# Patient Record
Sex: FEMALE | Race: WHITE
[De-identification: names, ages, dates, MRNs, and addresses within clinical notes are randomized per-mention and may not be internally consistent; named-entity substitution may affect disease eponyms.]

---

## 2018-03-05 ENCOUNTER — HOSPITAL ENCOUNTER (EMERGENCY)
Dept: HOSPITAL 42 - ED | Age: 48
Discharge: HOME | End: 2018-03-05
Payer: MEDICARE

## 2018-03-05 VITALS
HEART RATE: 80 BPM | DIASTOLIC BLOOD PRESSURE: 72 MMHG | SYSTOLIC BLOOD PRESSURE: 117 MMHG | RESPIRATION RATE: 20 BRPM | OXYGEN SATURATION: 99 %

## 2018-03-05 VITALS — BODY MASS INDEX: 26.2 KG/M2

## 2018-03-05 VITALS — TEMPERATURE: 98.3 F

## 2018-03-05 DIAGNOSIS — I10: ICD-10-CM

## 2018-03-05 DIAGNOSIS — M25.531: Primary | ICD-10-CM

## 2018-03-05 NOTE — ED PDOC
Arrival/HPI





- General


Chief Complaint: Finger,Hand,&Wrist


Time Seen by Provider: 03/05/18 10:34


Historian: Patient





- History of Present Illness


Narrative History of Present Illness (Text): 





03/05/18 12:37


47yr old female with right wrist pain and swelling. pt with hx of multiple 

prior surgeries on right wrist. pt states she woke up yesterday with swelling 

to the dorsal aspect of the right wrist with pain. pt states she took naproxen 

for pain. pt states she is worried that there is an issue with the hardware 

within her wrist. pt denies trauma or injury. Pt denies numbness or weakness. 

pt states she does have residual limited extension of right 5th finger from 

prior surgery. no other complaints. 


Time/Duration: Other (1 day)





Past Medical History





- Provider Review


Nursing Documentation Reviewed: Yes





- Travel History


Have you recently traveled outside [place] w/in the past 3 mons?: No





- Infectious Disease


Hx of Infectious Diseases: None





- Tetanus Immunization


Tetanus Immunization: Unknown





- Cardiac


Hx Cardiac Disorders: Yes


Hx Circulatory Problems: Yes


Hx Heart Murmur: Yes


Hx Hypertension: Yes





- Pulmonary


Hx Respiratory Disorders: Yes


Hx Asthma: Yes


Hx Pulmonary Embolism: Yes (june 16 2015)





- Neurological


Hx Neurological Disorder: Yes


Other/Comment: peripheral neuropathy in arms and legs





- HEENT


Hx HEENT Disorder: No





- Renal


Hx Renal Disorder: No





- Endocrine/Metabolic


Hx Endocrine Disorders: No





- Hematological/Oncological


Hx Blood Disorders: Yes


Hx Anemia: Yes





- Integumentary


Hx Dermatological Disorder: Yes





- Musculoskeletal/Rheumatological


Hx Musculoskeletal Disorders: Yes


Hx Degenerative Joint Disease: Yes


Hx Falls: No


Hx Herniated Disk: Yes (cervical lumbar)


Hx Rheumatoid Arthritis: Yes


Other/Comment: NECK PAIN





- Gastrointestinal


Hx Gastrointestinal Disorders: No





- Genitourinary/Gynecological


Hx Genitourinary Disorders: No





- Psychiatric


Hx Psychophysiologic Disorder: No


Hx Substance Use: No





- Surgical History


Hx Appendectomy: Yes


Hx Orthopedic Surgery: Yes (left wrist carpal tunnel repair right wrist)


Hx Tubal Ligation: Yes





- Anesthesia


Hx Anesthesia: Yes


Hx Anesthesia Reactions: No


Hx Malignant Hyperthermia: No





- Suicidal Assessment


Feels Threatened In Home Enviroment: No





Family/Social History





- Physician Review


Nursing Documentation Reviewed: Yes


Family/Social History: Unknown Family HX


Smoking Status: Never Smoked


Hx Alcohol Use: No


Hx Substance Use: No


Hx Substance Use Treatment: No





Allergies/Home Meds


Allergies/Adverse Reactions: 


Allergies





hydrocortisone Allergy (Verified 03/05/18 09:56)


 RASH


latex Allergy (Verified 03/05/18 09:56)


 SHORTNESS OF BREATH


nasal cannula Adverse Reaction (Uncoded 03/05/18 09:56)


 SHORTNESS OF BREATH








Home Medications: 


 Home Meds











 Medication  Instructions  Recorded  Confirmed


 


Prednisone [Prednisone] 1 tab PO DAILY 10/09/14 03/05/18


 


Tofacitinib Citrate [Xeljanz] 5 mg PO BID 06/16/15 03/05/18


 


Albuterol Sulfate [Ventolin Hfa] 2 puff IH BID PRN 01/04/16 03/05/18


 


Milnacipran HCl [Savella] 50 mg PO BID 01/22/16 03/05/18














Review of Systems





- Review of Systems


Constitutional: absent: Fatigue, Fevers


Respiratory: absent: SOB, Cough


Cardiovascular: absent: Chest Pain, Palpitations


Gastrointestinal: absent: Abdominal Pain, Nausea, Vomiting


Musculoskeletal: Arthralgias


Skin: absent: Rash, Pruritis


Psychiatric: absent: Anxiety, Depression





Physical Exam


Vital Signs Reviewed: Yes





Vital Signs











  Temp Pulse Resp BP Pulse Ox


 


 03/05/18 11:32   68  18  116/71  100


 


 03/05/18 10:02  98.3 F  70  16  118/76  100











Temperature: Afebrile


Blood Pressure: Normal


Pulse: Regular


Respiratory Rate: Normal


Appearance: Positive for: Well-Appearing, Non-Toxic, Comfortable


Pain Distress: None


Mental Status: Positive for: Alert and Oriented X 3





- Systems Exam


Head: Present: Atraumatic


Respiratory/Chest: Present: Clear to Auscultation


Cardiovascular: Present: Regular Rate and Rhythm


Upper Extremity: Present: NORMAL PULSES, Tenderness (right wrist: + swelling 

noted to dorsal aspect of wrist over ulna; no erythema; limited rom of wrist; 

sensation and distal pulses intact. ), Swelling, Neurovascularly Intact, 

Capillary Refill < 2s.  No: Normal ROM, Erythema, Deformity


Neurological: Present: GCS=15


Skin: Present: Warm, Dry, Normal Color.  No: Rashes


Psychiatric: Present: Oriented x 3





Medical Decision Making


ED Course and Treatment: 





03/05/18 12:32


pt with right wrist pain; hx of prior surgery








xray; FINDINGS:


BONES:


There is a prosthesis in the distal ulna.  There has been previous fusion of 

the radius and carpal bones. The findings are unchanged. No acute fracture


JOINTS:


Normal. No dislocation. 


SOFT TISSUES:


Normal. 


OTHER FINDINGS:


None.


IMPRESSION:


No acute fracture








pt placed in sugar tong splint; 





pt claims that there was erythema to a small portion of the dorsal wrist; There 

is no erythema at present time, but I will cover with keflex. 








pt was advised to f/u with orthopedist/surgeon within the next 2 days. 











impression: wrist pain


motrin every 6 hours as needed for pain


keflex; 1 capsule 4 times daily x 7 days


follow up with the orthopedist within the next 2 days


return if symptoms worsen,persist or if new symptoms develop. 











- RAD Interpretation


Radiology Orders: 











03/05/18 10:45


WRIST, RIGHT 3 VIEWS [RAD] Stat 














Procedures





- Splinting


Location: right wrist


Hand-Made Type: fiberglass


Splint: sugar tong


Pre-Proc Neuro Vasc Exam: normal


Post-Proc Neuro Vasc Exam: normal





Disposition/Present on Arrival





- Present on Arrival


Any Indicators Present on Arrival: No


History of DVT/PE: No


History of Uncontrolled Diabetes: No


Urinary Catheter: No


History of Decub. Ulcer: No


History Surgical Site Infection Following: None





- Disposition


Have Diagnosis and Disposition been Completed?: Yes


Diagnosis: 


 Wrist pain





Disposition: HOME/ ROUTINE


Disposition Time: 12:35


Patient Plan: Discharge


Condition: GOOD


Additional Instructions: 


tylenol every 4 hours as needed for pain


keflex; 1 capsule 4 times daily x 7 days


follow up with the orthopedist within the next 2 days


return if symptoms worsen,persist or if new symptoms develop. 


Prescriptions: 


Cephalexin [Keflex] 500 mg PO QID #28 capsule


Referrals: 


Clary Guo MD [Staff Provider] - Follow up with primary


[facility] [Outside] - Follow up with primary


Forms:  Spar[employer] (English), WORK NOTE

## 2018-09-18 ENCOUNTER — HOSPITAL ENCOUNTER (OUTPATIENT)
Dept: HOSPITAL 42 - ED | Age: 48
Setting detail: OBSERVATION
LOS: 2 days | Discharge: HOME | End: 2018-09-20
Attending: INTERNAL MEDICINE | Admitting: INTERNAL MEDICINE
Payer: MEDICARE

## 2018-09-18 VITALS — BODY MASS INDEX: 21.6 KG/M2

## 2018-09-18 DIAGNOSIS — E86.0: ICD-10-CM

## 2018-09-18 DIAGNOSIS — M06.9: ICD-10-CM

## 2018-09-18 DIAGNOSIS — E78.5: ICD-10-CM

## 2018-09-18 DIAGNOSIS — K29.70: ICD-10-CM

## 2018-09-18 DIAGNOSIS — Z86.711: ICD-10-CM

## 2018-09-18 DIAGNOSIS — R11.2: ICD-10-CM

## 2018-09-18 DIAGNOSIS — G62.9: ICD-10-CM

## 2018-09-18 DIAGNOSIS — M79.7: ICD-10-CM

## 2018-09-18 DIAGNOSIS — F12.90: ICD-10-CM

## 2018-09-18 DIAGNOSIS — Z79.82: ICD-10-CM

## 2018-09-18 DIAGNOSIS — I73.00: ICD-10-CM

## 2018-09-18 DIAGNOSIS — K59.00: ICD-10-CM

## 2018-09-18 DIAGNOSIS — I10: ICD-10-CM

## 2018-09-18 DIAGNOSIS — E87.6: Primary | ICD-10-CM

## 2018-09-18 DIAGNOSIS — J45.909: ICD-10-CM

## 2018-09-18 LAB
ALBUMIN SERPL-MCNC: 5 G/DL (ref 3–4.8)
ALBUMIN/GLOB SERPL: 1.3 {RATIO} (ref 1.1–1.8)
ALT SERPL-CCNC: 24 U/L (ref 7–56)
APPEARANCE UR: CLEAR
AST SERPL-CCNC: 35 U/L (ref 14–36)
BILIRUB UR-MCNC: NEGATIVE MG/DL
BUN SERPL-MCNC: 13 MG/DL (ref 7–21)
CALCIUM SERPL-MCNC: 10 MG/DL (ref 8.4–10.5)
COLOR UR: (no result)
GFR NON-AFRICAN AMERICAN: > 60
GLUCOSE UR STRIP-MCNC: NEGATIVE MG/DL
LEUKOCYTE ESTERASE UR-ACNC: NEGATIVE LEU/UL
PH UR STRIP: 7 [PH] (ref 4.7–8)
PROT UR STRIP-MCNC: NEGATIVE MG/DL
RBC # UR STRIP: NEGATIVE /UL
SP GR UR STRIP: <= 1.005 (ref 1–1.03)
UROBILINOGEN UR STRIP-ACNC: 0.2 E.U./DL

## 2018-09-18 PROCEDURE — 96361 HYDRATE IV INFUSION ADD-ON: CPT

## 2018-09-18 PROCEDURE — 83735 ASSAY OF MAGNESIUM: CPT

## 2018-09-18 PROCEDURE — 88305 TISSUE EXAM BY PATHOLOGIST: CPT

## 2018-09-18 PROCEDURE — 36415 COLL VENOUS BLD VENIPUNCTURE: CPT

## 2018-09-18 PROCEDURE — 96375 TX/PRO/DX INJ NEW DRUG ADDON: CPT

## 2018-09-18 PROCEDURE — 93005 ELECTROCARDIOGRAM TRACING: CPT

## 2018-09-18 PROCEDURE — 94760 N-INVAS EAR/PLS OXIMETRY 1: CPT

## 2018-09-18 PROCEDURE — 80053 COMPREHEN METABOLIC PANEL: CPT

## 2018-09-18 PROCEDURE — 74176 CT ABD & PELVIS W/O CONTRAST: CPT

## 2018-09-18 PROCEDURE — 85025 COMPLETE CBC W/AUTO DIFF WBC: CPT

## 2018-09-18 PROCEDURE — 96374 THER/PROPH/DIAG INJ IV PUSH: CPT

## 2018-09-18 PROCEDURE — 99285 EMERGENCY DEPT VISIT HI MDM: CPT

## 2018-09-18 PROCEDURE — 88342 IMHCHEM/IMCYTCHM 1ST ANTB: CPT

## 2018-09-18 PROCEDURE — 81003 URINALYSIS AUTO W/O SCOPE: CPT

## 2018-09-18 PROCEDURE — 84100 ASSAY OF PHOSPHORUS: CPT

## 2018-09-18 PROCEDURE — 84145 PROCALCITONIN (PCT): CPT

## 2018-09-18 PROCEDURE — 94640 AIRWAY INHALATION TREATMENT: CPT

## 2018-09-18 PROCEDURE — 96376 TX/PRO/DX INJ SAME DRUG ADON: CPT

## 2018-09-18 PROCEDURE — 43239 EGD BIOPSY SINGLE/MULTIPLE: CPT

## 2018-09-18 PROCEDURE — 71046 X-RAY EXAM CHEST 2 VIEWS: CPT

## 2018-09-18 PROCEDURE — 84703 CHORIONIC GONADOTROPIN ASSAY: CPT

## 2018-09-18 NOTE — ED PDOC
Arrival/HPI





- General


Chief Complaint: GI Problem


Time Seen by Provider: 09/18/18 15:44


Historian: Patient





- History of Present Illness


Narrative History of Present Illness (Text): 





09/18/18 16:19


48 yo F with past medical history of rheumatoid arthritis, complaining of 

intermittent nausea with vomiting for the past 2 years. Patient states that the 

nausea and vomiting comes in episodes and typically lasts for 1-2 days, however 

her current episode has been ongoing for about a week without improvement. 

States that she saw her rheumatologist earlier today, had blood work done and 

was told that she had low potassium. She then called her primary care doctor 

who advised her to come to the emergency room to be evaluated and for possible 

observation in the hospital with possible consultation with a specialist. 

Patient states that the nausea with vomiting has never been worked up by a 

specialist and is not related to her RA or taking RA medications since the 

nausea and vomiting started prior to her taking RA medications. Otherwise 

reports no fever, chills, chest pain, palpitations, shortness of breath, 

abdominal pain, back pain, urinary symptoms, diarrhea.





MEGGAN Toledo











Past Medical History





- Infectious Disease


Hx of Infectious Diseases: None





- Tetanus Immunization


Tetanus Immunization: Unknown





- Cardiac


Hx Cardiac Disorders: Yes


Hx Circulatory Problems: Yes


Hx Heart Murmur: Yes


Hx Hypertension: Yes





- Pulmonary


Hx Respiratory Disorders: Yes


Hx Asthma: Yes


Hx Pulmonary Embolism: Yes (june 16 2015)





- Neurological


Hx Neurological Disorder: Yes


Other/Comment: peripheral neuropathy in arms and legs





- HEENT


Hx HEENT Disorder: No





- Renal


Hx Renal Disorder: No





- Endocrine/Metabolic


Hx Endocrine Disorders: No





- Hematological/Oncological


Hx Blood Disorders: Yes


Hx Anemia: Yes





- Integumentary


Hx Dermatological Disorder: Yes





- Musculoskeletal/Rheumatological


Other/Comment: limited rom right wrist





- Gastrointestinal


Hx Gastrointestinal Disorders: No





- Genitourinary/Gynecological


Hx Genitourinary Disorders: No





- Psychiatric


Hx Psychophysiologic Disorder: No


Hx Substance Use: No





- Surgical History


Hx Orthopedic Surgery: Yes (left wrist carpal tunnel repair right wrist radial 

carpal sx and fusion)





- Anesthesia


Hx Anesthesia: Yes


Hx Anesthesia Reactions: No


Hx Malignant Hyperthermia: No





- Suicidal Assessment


Feels Threatened In Home Enviroment: No





Family/Social History


Family/Social History: No Known Family HX


Smoking Status: Never Smoked


Hx Alcohol Use: No


Hx Substance Use: No


Hx Substance Use Treatment: No





Allergies/Home Meds


Allergies/Adverse Reactions: 


Allergies





hydrocortisone Allergy (Verified 09/18/18 15:50)


 RASH


latex Allergy (Verified 09/18/18 15:50)


 SHORTNESS OF BREATH


nasal cannula Adverse Reaction (Uncoded 09/18/18 15:50)


 SHORTNESS OF BREATH








Home Medications: 


 Home Meds











 Medication  Instructions  Recorded  Confirmed


 


Milnacipran HCl [Savella] 50 mg PO BID 01/22/16 09/18/18


 


Aspirin [Lo-Dose Aspirin EC] 81 mg PO DAILY 09/18/18 09/18/18


 


Ibuprofen/Famotidine [Duexis 1 tab PO BID 09/18/18 09/18/18





800-26.6 mg Tablet]   


 


Montelukast [Singulair] 10 mg PO DAILY 09/18/18 09/18/18


 


Prednisone [Manisha] 5 mg PO DAILY 09/18/18 09/18/18


 


Tofacitinib Citrate [Xeljanz Xr] 11 mg PO DAILY 09/18/18 09/18/18


 


hydroCHLOROthiazide [Microzide] 50 mg PO DAILY 09/18/18 09/18/18














Review of Systems





- Review of Systems


Constitutional: absent: Fatigue, Fevers


Respiratory: absent: SOB, Cough


Cardiovascular: absent: Chest Pain, Palpitations


Gastrointestinal: Nausea, Vomiting.  absent: Abdominal Pain, Diarrhea


Genitourinary Female: absent: Dysuria, Frequency


Musculoskeletal: absent: Arthralgias, Back Pain


Skin: absent: Rash, Pruritis, Skin Lesions


Neurological: absent: Headache, Dizziness





Physical Exam


Vital Signs











  Temp Pulse Resp BP Pulse Ox


 


 09/18/18 18:07   58 L  18  155/83 H  100


 


 09/18/18 15:46  98.7 F  68  18  149/81  100











Temperature: Afebrile


Blood Pressure: Normal


Pulse: Regular


Respiratory Rate: Normal


Appearance: Positive for: Well-Appearing, Non-Toxic, Comfortable


Pain Distress: None


Mental Status: Positive for: Alert and Oriented X 3





- Systems Exam


Head: Present: Atraumatic, Normocephalic


Pupils: Present: PERRL


Extroacular Muscles: Present: EOMI


Conjunctiva: Present: Normal


Mouth: Present: Dry


Neck: Present: Normal Range of Motion.  No: Lymphadenopathy


Respiratory/Chest: Present: Clear to Auscultation, Good Air Exchange.  No: 

Respiratory Distress, Accessory Muscle Use


Cardiovascular: Present: Regular Rate and Rhythm, Normal S1, S2.  No: Murmurs


Abdomen: No: Tenderness, Distention, Peritoneal Signs, Rebound, Guarding


Back: Present: Normal Inspection.  No: CVA Tenderness, Midline Tenderness


Upper Extremity: Present: Normal Inspection.  No: Cyanosis, Edema


Lower Extremity: Present: Normal Inspection.  No: Edema


Neurological: Present: GCS=15, CN II-XII Intact, Speech Normal, Motor Func 

Grossly Intact, Normal Sensory Function


Skin: Present: Warm, Dry, Normal Color.  No: Rashes


Psychiatric: Present: Alert, Oriented x 3, Normal Insight, Normal Concentration





Medical Decision Making


ED Course and Treatment: 





09/18/18 16:23


Patient had labs done earlier today and results reviewed, K 2.9. 


Rest of the labs from earlier today : 


wbc 7.9 / hgb 13.1 / hct 38.4 / plt 373


Na 136 / Cl 95 / Co2  29 / Bun 15 / creat 0.6 / glucose 95 / Ca 9.9








Plan : 


- IV


- Labs


- Zofran IV


- CMP 


- UA


- KCl PO x2


- KCl IV x2 riders








EKG: NSR at 64 bpm, no acute ST changes, as read by PA. 





09/18/18 17:10


Labs reviewed repeat K 3.4. KCl IV cancelled. Patient still given PO KCl. 


Case d/w Dr. Toledo, who still request observation for dehydration and with 

GI consult to Dr. Dutton





09/18/18 17:15


Case d/w the hospitalist Dr. Enriquez, agrees to plan for further observation. 


Patient notified of plan for further observation, which she agrees to. 

















- Lab Interpretations


Lab Results: 








 09/18/18 16:42 





 Lab Results





09/18/18 16:42: Sodium 137, Potassium 3.4 L, Chloride 98, Carbon Dioxide 27, 

Anion Gap 15, BUN 13, Creatinine 0.6 L, Est GFR ( Amer) > 60, Est GFR (

Non-Af Amer) > 60, Random Glucose 93, Calcium 10.0, Total Bilirubin 0.4, AST 35

, ALT 24, Alkaline Phosphatase 73, Total Protein 8.8 H, Albumin 5.0 H, Globulin 

3.8, Albumin/Globulin Ratio 1.3


09/18/18 16:05: Urine Color Light yellow, Urine Appearance Clear, Urine pH 7.0, 

Ur Specific Gravity <= 1.005, Urine Protein Negative, Urine Glucose (UA) 

Negative, Urine Ketones Negative, Urine Blood Negative, Urine Nitrate Negative, 

Urine Bilirubin Negative, Urine Urobilinogen 0.2, Ur Leukocyte Esterase Negative











- Medication Orders


Current Medication Orders: 








Acetaminophen (Tylenol 325mg Tab)  650 mg PO Q6H PRN


   PRN Reason: Fever >100.4 F


Diphenhydramine HCl (Benadryl)  25 mg PO DAILY PRN


   PRN Reason: Allergy symptoms


Sodium Chloride (Sodium Chloride 0.9%)  1,000 mls @ 100 mls/hr IV .Q10H STA


   Stop: 09/19/18 03:20


Potassium Chloride 40 meq/ (Sodium Chloride)  1,020 mls @ 100 mls/hr IV 

[id number] [facility]


Montelukast Sodium (Singulair)  10 mg PO DAILY [facility]


Home Med - Milnacipran Hcl [ Savella] 50 Mg  50 mg PO BID [facility]


Home Med - Tofacitinib Citrate [Xeljanz Xr] 11 Mg  11 mg PO DAILY [facility]


Ondansetron HCl (Zofran Inj)  4 mg IVP Q12H PRN


   PRN Reason: Nausea/Vomiting


   Last Admin: 09/18/18 20:00  Dose: 4 mg





IVP Administration


 Document     09/18/18 20:00  [facility]  (Rec: 09/18/18 20:00  [place] [id number])


     Charges for Administration


      # of IVP Administrations                   1





Pantoprazole Sodium (Protonix Inj)  40 mg IVP DAILY [facility]


   Last Admin: 09/18/18 20:23  Dose: 40 mg





IVP Administration


 Document     09/18/18 20:23  [facility]  (Rec: 09/18/18 20:23  [place] [id number])


     Charges for Administration


      # of IVP Administrations                   1





Prednisone (Prednisone Tab)  5 mg PO DAILY [facility]





Discontinued Medications





Sodium Chloride (Sodium Chloride 0.9%)  1,000 mls @ 1,000 mls/hr IV .Q1H STA


   Stop: 09/18/18 17:00


   Last Admin: 09/18/18 16:58  Dose: 1,000 mls/hr





eMAR Start Stop


 Document     09/18/18 16:58  GMD  (Rec: 09/18/18 16:59  GMD  [id number])


     Intravenous Solution


      Start Date                                 09/18/18


      Start Time                                 16:58


      End Date                                   09/18/18


      End time                                   17:58


      Total Infusion Time                        60





Ondansetron HCl (Zofran Inj)  4 mg IVP STAT STA


   Stop: 09/18/18 16:02


   Last Admin: 09/18/18 16:59  Dose: 4 mg





IVP Administration


 Document     09/18/18 16:59  GMD  (Rec: 09/18/18 16:59  GMD  [id number])


     Charges for Administration


      # of IVP Administrations                   1





Potassium Chloride (Potassium Chloride Oral Soln)  40 meq PO STAT STA


   Stop: 09/18/18 16:15


   Last Admin: 09/18/18 16:59  Dose: 40 meq











- PA / NP / Resident Statement


MD/DO has reviewed & agrees with the documentation as recorded.





Disposition/Present on Arrival





- Present on Arrival


Any Indicators Present on Arrival: No


History of DVT/PE: No


History of Uncontrolled Diabetes: No


Urinary Catheter: No


History of Decub. Ulcer: No


History Surgical Site Infection Following: None





- Disposition


Have Diagnosis and Disposition been Completed?: Yes


Diagnosis: 


 Hypokalemia, Nausea and vomiting, Dehydration





Disposition: HOSPITALIZED


Disposition Time: 17:15


Patient Plan: Observation


Patient Problems: 


 Current Active Problems











Problem Status Onset


 


Dehydration Acute  


 


Hypokalemia Acute  


 


Nausea and vomiting Acute  











Condition: STABLE

## 2018-09-18 NOTE — CP.PCM.HP
<AlokAugie Keith - Last Filed: 09/18/18 20:31>





History of Present Illness





- History of Present Illness


History of Present Illness: 





Augie Dickinson PGY1, History and Physical for Dr Enriquez





Pt is a 46 yo female with a PMH of RA, fibro, HTN, asthma, OA, raynaud's 

syndrome, PE who presents to the ED complaining of vomiting. Pt states she has 

experienced vomiting without nausea for the past 2 years. She has had 2-3 

episodes of vomiting per month up until this time. She states that the episodes 

have started to occur more often which worried her and prompted her to present 

to the ED. Pt reports vomiting into her mouth after she eats or drinks, this 

occurs without warning and no associated nausea. There is no particular time of 

day when this occurs. Pt reports sometimes only vomiting "stomach acid". Pt 

denies abdominal pain, fever, nausea during these episodes. She states that she 

tried tums, but it did not alleviate the vomiting. Pt denies trouble swallowing 

or a sour taste in her mouth in the morning when she wakes up.  Pt has never 

had an endoscopy. She denies excessive NSAID use. Pt admits to daily marijuana 

use for 'years'. A 12 point ROS was obtained and added to HPI where 

appropriate. 








PMH: RA, fibro, HTN, asthma, OA, raynaud's syndrome, PE


PSH: appendectomy, carpal tunnel surgery, tubal ligation


FH: Mother 68 Diverticulitis, Father 46 suicide


SH: Denies tobacco, Marlene alcohol, admits to daily marijuana, lives in [place], Retired ophthalmology assistant 


Home meds: Duexis BID, Prednisone 5mg daily, xelijenz xr 11m, savella BID, HCTZ 

25mg, ASA 81mg, Singulair 10mg


Allergies: hydrocortisone causes hives


PMD: Dr Angulo


Rheumatologist: Sudha





Present on Admission





- Present on Admission


Any Indicators Present on Admission: Yes


History of DVT/PE: Yes





Review of Systems





- Review of Systems


Review of Systems: 





a 12 point ROS was obtained and added to HPI where appropriate 





Past Patient History





- Infectious Disease


Hx of Infectious Diseases: None





- Tetanus Immunizations


Tetanus Immunization: Unknown





- Past Medical History & Family History


Past Medical History?: Yes





- Past Social History


Smoking Status: Never Smoked





- CARDIAC


Hx Cardiac Disorders: Yes


Hx Circulatory Problems: Yes


Hx Heart Murmur: Yes


Hx Hypertension: Yes





- PULMONARY


Hx Respiratory Disorders: Yes


Hx Asthma: Yes


Hx Pulmonary Embolism: Yes (june 16 2015)





- NEUROLOGICAL


Hx Neurological Disorder: Yes


Other/Comment: peripheral neuropathy in arms and legs





- HEENT


Hx HEENT Problems: No





- RENAL


Hx Chronic Kidney Disease: No





- ENDOCRINE/METABOLIC


Hx Endocrine Disorders: No





- HEMATOLOGICAL/ONCOLOGICAL


Hx Blood Disorders: Yes


Hx Anemia: Yes





- INTEGUMENTARY


Hx Dermatological Problems: Yes





- MUSCULOSKELETAL/RHEUMATOLOGICAL


Other/Comment: limited rom right wrist





- GASTROINTESTINAL


Hx Gastrointestinal Disorders: No





- GENITOURINARY/GYNECOLOGICAL


Hx Genitourinary Disorders: No





- PSYCHIATRIC


Hx Psychophysiologic Disorder: No


Hx Substance Use: No





- SURGICAL HISTORY


Hx Orthopedic Surgery: Yes (left wrist carpal tunnel repair right wrist radial 

carpal sx and fusion)





- ANESTHESIA


Hx Anesthesia: Yes


Hx Anesthesia Reactions: No


Hx Malignant Hyperthermia: No





Meds


Home Medications: 


 Home Medication List











 Medication  Instructions  Recorded  Confirmed  Type


 


Methylprednisolone [Medrol Dose 4 mg PO ASDIR #21 mg 09/20/18  Rx





Pack (21 tabs)]    


 


Ondansetron HCl [Zofran] 4 mg PO Q8 7 Days #21 tablet 09/20/18  Rx


 


Pantoprazole [Protonix EC Tab] 40 mg PO ACB 7 Days #7 ect 09/20/18  Rx











Allergies/Adverse Reactions: 


 Allergies











Allergy/AdvReac Type Severity Reaction Status Date / Time


 


hydrocortisone Allergy  RASH Verified 09/18/18 15:50


 


latex Allergy  SHORTNESS Verified 09/18/18 15:50





   OF BREATH  


 


nasal cannula AdvReac  SHORTNESS Uncoded 09/18/18 15:50





   OF BREATH  














Physical Exam





- Head Exam


Head Exam: ATRAUMATIC, NORMOCEPHALIC





- ENT Exam


ENT Exam: Mucous Membranes Moist





- Respiratory Exam


Respiratory Exam: Clear to Auscultation Bilateral, NORMAL BREATHING PATTERN





- Cardiovascular Exam


Cardiovascular Exam: REGULAR RHYTHM, RRR, +S1, +S2





- GI/Abdominal Exam


GI & Abdominal Exam: Normal Bowel Sounds, Soft.  absent: Tenderness





- Extremities Exam


Extremities exam: Positive for: full ROM.  Negative for: calf tenderness





- Neurological Exam


Neurological exam: Alert, Oriented x3





- Psychiatric Exam


Psychiatric exam: Normal Affect, Normal Mood





- Skin


Skin Exam: Dry, Normal Color, Warm





Results





- Vital Signs


Recent Vital Signs: 





 Last Vital Signs











Temp  98.7 F   09/18/18 15:46


 


Pulse  58 L  09/18/18 18:07


 


Resp  18   09/18/18 18:07


 


BP  155/83 H  09/18/18 18:07


 


Pulse Ox  100   09/18/18 18:07














- Labs


Result Diagrams: 


 09/18/18 16:42





Assessment & Plan





- Assessment and Plan (Free Text)


Assessment: 





Pt is a 46 yo female with a PMH of RA, fibro, HTN, asthma, OA, raynaud's 

syndrome, PE who presents to the ED complaining of vomiting.


Plan: 





Vomiting without nausea or abdominal pain


- symptoms intermittent for the past 2 years, increased frequency in the past 

week


- possible esophageal dysmotility disorder, does not seem infectious in etiology

, however pt without dysphasia 


- holding off antibiotics at this time, no indication for infectious etiology 


- start IV pantoprazole 40mg


- consulted Santizo, GI


- procal


- NS 100ml/hr with potassium 40meq


- holding home ASA at this time





Hypokalemia


- replete PRN


- pt replete in ED


- now being given 40meq within maintenance fluid 





RA


- continue tofacitinib


- prednisone 5mg PO


- Benadryl 25 PO for allergic symptoms 





Fibromyalgia 


- continue Savella (pt has been on this medication for the past 9 years)





HLD


- , , 


- pt not on home statin, will hold off on starting at this time





Ppx


- SCD


- HHD (pt without symptoms of dysphagia or odynophagia)


- pantoprazole 40mg IV





Pt seen, examined, assessment and plan discussed with Dr Mina Dickinson PGY1





- Date & Time


Date: 09/18/18


Time: 18:00





<Aayush Enriquez - Last Filed: 09/20/18 16:06>





Results





- Vital Signs


Recent Vital Signs: 





 Last Vital Signs











Temp  98.3 F   09/20/18 11:40


 


Pulse  66   09/20/18 11:40


 


Resp  18   09/20/18 11:40


 


BP  106/72   09/20/18 11:40


 


Pulse Ox  100   09/20/18 11:40














- Labs


Result Diagrams: 


 09/20/18 06:20





 09/20/18 06:20


Labs: 





 Laboratory Results - last 24 hr











  09/20/18 09/20/18





  06:20 06:20


 


WBC  11.2 H D 


 


RBC  4.17 


 


Hgb  12.2 


 


Hct  37.2 


 


MCV  89.2 


 


MCH  29.3 


 


MCHC  32.8 


 


RDW  15.5 H 


 


Plt Count  375 


 


MPV  9.9 


 


Gran %  85.2 H 


 


Lymph % (Auto)  8.4 L 


 


Mono % (Auto)  6.3 H 


 


Eos % (Auto)  0.0 L 


 


Baso % (Auto)  0.1 


 


Gran #  9.55 H 


 


Lymph # (Auto)  0.9 L 


 


Mono # (Auto)  0.7 H 


 


Eos # (Auto)  0.0 


 


Baso # (Auto)  0.01 


 


Sodium   140


 


Potassium   4.9


 


Chloride   107


 


Carbon Dioxide   25


 


Anion Gap   13


 


BUN   10


 


Creatinine   0.6 L


 


Est GFR ( Amer)   > 60


 


Est GFR (Non-Af Amer)   > 60


 


Random Glucose   115 H


 


Calcium   9.5


 


Total Bilirubin   0.5


 


AST   29


 


ALT   20


 


Alkaline Phosphatase   48


 


Total Protein   7.8


 


Albumin   4.5


 


Globulin   3.3


 


Albumin/Globulin Ratio   1.3














Attending/Attestation





- Attestation


I have personally seen and examined this patient.: Yes


I have fully participated in the care of the patient.: Yes


I have reviewed all pertinent clinical information: Yes


Notes (Text): 





09/20/18 16:01





Attending note;





Patient seen and examined with resident in ER.


Patient is alert and awake.


Complaining of vomiting.


Denies any diarrhea, urinary symptoms.


Denies any fevers, chills.





Patient is a 47 year old female with  PMH of rheumatoid arthritis, fibromyalgia 

,HTN, asthma, Raynaud's syndrome, previous history of pulmonary embolism was 

admitted for vomiting. Pt states she has experienced vomiting without nausea 

for the past 2 years on and off. Currently the frequency of vomiting is 

increasing.


Patient was found to be hypokalemic during outpatient workup.


And refer to the ER for vomiting/dehydration and hypokalemia.


Started on IV fluids with potassium supplementation.


Monitor electrolytes closely.





GI evaluation requested. Might need EGD.





History of rheumatoid arthritis; avoid NSAIDs. Continue Protonix.


Continue Savella nad xeljanz per  rheumatologist.





History of asthma; currently stable respiratory status.





Upon discharge the patient will follow-up with PMD Dr. Toledo.

## 2018-09-19 VITALS — TEMPERATURE: 98.3 F

## 2018-09-19 LAB
ALBUMIN SERPL-MCNC: 4 G/DL (ref 3–4.8)
ALBUMIN/GLOB SERPL: 1.4 {RATIO} (ref 1.1–1.8)
ALT SERPL-CCNC: 20 U/L (ref 7–56)
AST SERPL-CCNC: 31 U/L (ref 14–36)
BASOPHILS # BLD AUTO: 0.02 K/MM3 (ref 0–2)
BASOPHILS NFR BLD: 0.3 % (ref 0–3)
BUN SERPL-MCNC: 10 MG/DL (ref 7–21)
CALCIUM SERPL-MCNC: 9 MG/DL (ref 8.4–10.5)
EOSINOPHIL # BLD: 0.1 10*3/UL (ref 0–0.7)
EOSINOPHIL NFR BLD: 1.3 % (ref 1.5–5)
ERYTHROCYTE [DISTWIDTH] IN BLOOD BY AUTOMATED COUNT: 15.4 % (ref 11.5–14.5)
GFR NON-AFRICAN AMERICAN: > 60
GRANULOCYTES # BLD: 3.52 10*3/UL (ref 1.4–6.5)
GRANULOCYTES NFR BLD: 55.9 % (ref 50–68)
HGB BLD-MCNC: 11.6 G/DL (ref 12–16)
LYMPHOCYTES # BLD: 1.6 10*3/UL (ref 1.2–3.4)
LYMPHOCYTES NFR BLD AUTO: 25.3 % (ref 22–35)
MCH RBC QN AUTO: 29.4 PG (ref 25–35)
MCHC RBC AUTO-ENTMCNC: 33 G/DL (ref 31–37)
MCV RBC AUTO: 88.9 FL (ref 80–105)
MONOCYTES # BLD AUTO: 1.1 10*3/UL (ref 0.1–0.6)
MONOCYTES NFR BLD: 17.2 % (ref 1–6)
PLATELET # BLD: 345 10^3/UL (ref 120–450)
PMV BLD AUTO: 9.7 FL (ref 7–11)
RBC # BLD AUTO: 3.95 10^6/UL (ref 3.5–6.1)
WBC # BLD AUTO: 6.3 10^3/UL (ref 4.5–11)

## 2018-09-19 RX ADMIN — METHYLPREDNISOLONE SODIUM SUCCINATE SCH MG: 40 INJECTION, POWDER, FOR SOLUTION INTRAMUSCULAR; INTRAVENOUS at 21:40

## 2018-09-19 RX ADMIN — METHYLPREDNISOLONE SODIUM SUCCINATE SCH MG: 40 INJECTION, POWDER, FOR SOLUTION INTRAMUSCULAR; INTRAVENOUS at 12:02

## 2018-09-19 RX ADMIN — IPRATROPIUM BROMIDE AND ALBUTEROL SULFATE PRN ML: .5; 3 SOLUTION RESPIRATORY (INHALATION) at 07:47

## 2018-09-19 RX ADMIN — IPRATROPIUM BROMIDE AND ALBUTEROL SULFATE PRN ML: .5; 3 SOLUTION RESPIRATORY (INHALATION) at 13:38

## 2018-09-19 NOTE — CARD
--------------- APPROVED REPORT --------------





Date of service: 09/18/2018



EKG Measurement

Heart [id number]

DE [id number]

[id number]

[id number]

[id number]



<Conclusion>

Normal sinus rhythm

Normal ECG

## 2018-09-19 NOTE — CP.PCM.PN
<Kobe Foley - Last Filed: 09/19/18 15:17>





Subjective





- Date & Time of Evaluation


Date of Evaluation: 09/19/18


Time of Evaluation: 07:00





- Subjective


Subjective: 





Kobe Foley, PGY1 Medicine Progress Note for Dr. Enriquez





Patient was seen at bedside this morning. She clarified that she has had 

vomiting for almost two years. However, it has worsened in the past few weeks. 

Patient does not have any nausea or abdominal pain. She denies dysphagia, 

recent travel, sick contacts, and new foods. No episodes of emesis during 

hospital admission. She says that she has lost at least 10 pounds related to 

the vomiting in the past few weeks. She denies abdominal pain, chest pain, 

shortness of breath, bowel/bladder changes, numbness/tingling of extremities. 





A full 12 point ROS was conducted and unremarkable except as stated above.








Objective





- Vital Signs/Intake and Output


Vital Signs (last 24 hours): 


 











Temp Pulse Resp BP Pulse Ox


 


 98.3 F   80   18   118/73   99 


 


 09/19/18 14:00  09/19/18 14:00  09/19/18 14:00  09/19/18 14:00  09/19/18 14:00











- Medications


Medications: 


 Current Medications





Acetaminophen (Tylenol 325mg Tab)  650 mg PO Q6H PRN


   PRN Reason: Fever >100.4 F


Albuterol/Ipratropium (Duoneb 3 Mg/0.5 Mg (3 Ml) Ud)  3 ml IH Q2H PRN


   PRN Reason: Shortness of Breath


   Last Admin: 09/19/18 13:38 Dose:  3 ml


Diphenhydramine HCl (Benadryl)  25 mg PO DAILY PRN


   PRN Reason: Allergy symptoms


Potassium Chloride 40 meq/ (Sodium Chloride)  1,020 mls @ 100 mls/hr IV 

.[id number] [facility]


   Last Admin: 09/19/18 12:01 Dose:  100 mls/hr


Methylprednisolone (Solu-Medrol)  40 mg IVP Q12 [facility]


   Last Admin: 09/19/18 12:02 Dose:  40 mg


Montelukast Sodium (Singulair)  10 mg PO HS MAGGIE


Home Med - Milnacipran Hcl [ Savella] 50 Mg  50 mg PO BID [facility]


   Last Admin: 09/19/18 10:04 Dose:  50 mg


Home Med - Tofacitinib Citrate [Xeljanz Xr] 11 Mg  11 mg PO DAILY [facility]


   Last Admin: 09/19/18 10:04 Dose:  Not Given


Ondansetron HCl (Zofran Inj)  4 mg IVP Q12H PRN


   PRN Reason: Nausea/Vomiting


   Last Admin: 09/18/18 20:00 Dose:  4 mg


Pantoprazole Sodium (Protonix Ec Tab)  40 mg PO ACB MAGGIE











- Labs


Labs: 


 





 09/19/18 06:30 





 09/19/18 06:30 











- Constitutional


Appears: Well, No Acute Distress





- Head Exam


Head Exam: ATRAUMATIC, NORMAL INSPECTION, NORMOCEPHALIC





- Eye Exam


Eye Exam: EOMI, Normal appearance, PERRL





- ENT Exam


ENT Exam: Mucous Membranes Moist, Normal Exam





- Neck Exam


Neck Exam: Full ROM, Normal Inspection.  absent: Lymphadenopathy





- Respiratory Exam


Respiratory Exam: Clear to Ausculation Bilateral, Wheezes, NORMAL BREATHING 

PATTERN.  absent: Rales, Rhonchi, Respiratory Distress





- Cardiovascular Exam


Cardiovascular Exam: REGULAR RHYTHM, +S1, +S2.  absent: Murmur





- GI/Abdominal Exam


GI & Abdominal Exam: Soft, Normal Bowel Sounds.  absent: Tenderness





- Extremities Exam


Extremities Exam: Full ROM, Normal Capillary Refill, Normal Inspection.  absent

: Joint Swelling, Pedal Edema, Tenderness





- Back Exam


Back Exam: NORMAL INSPECTION





- Neurological Exam


Neurological Exam: Alert, Awake, CN II-XII Intact, Normal Gait, Oriented x3


Neuro motor strength exam: Left Upper Extremity: 5, Right Upper Extremity: 5, 

Left Lower Extremity: 5, Right Lower Extremity: 5





- Psychiatric Exam


Psychiatric exam: Normal Affect, Normal Mood





- Skin


Skin Exam: Dry, Intact, Normal Color, Warm





Assessment and Plan





- Assessment and Plan (Free Text)


Assessment: 





Patient is a 46 yo female with a PMH of RA, fibromyalgia, HTN, asthma, OA, 

raynaud's syndrome, and PE who presents to the ED complaining of vomiting for 

almost 2 years that has worsened in the past few weeks. GI was consulted. 

Patient will be monitored on the floor. 


Plan: 





Worsening Vomiting w/o Associated Nausea or Abdominal Pain


- As per GI recs, f/u CT abdomen/pelvis with oral contrast 


- Plan for upper endoscopy tomorrow morning


- vomiting intermittent for the past 2 years, increased frequency in the past 

few weeks


- GI consulted. Recs appreciated. 


- procal negative, afebrile, no leukocytosis; low suspicion for infectious 

etiology 


- zofran prn, although no subjective complaints of nausea





Asthma


- Wheezing noted on physical exam


- duonebs q2 prn


- IV steroids 40 mg q 12, taper as needed


- c/w singulair 





Hypokalemia - improving 


- K is 3.9 now


- 3.4 in ED


- repleted


- EKG: NSR. No ST or T wave changes.





RA


- c/w tofacitinib


- Benadryl 25 PO for allergic symptoms 





Fibromyalgia 


- continue Savella (pt has been on this medication for the past 9 years)





HLD


- , 


- pt not on home statin, will hold off on starting at this time





Ppx


- DVT ppx: SCD


- GI ppx: protonix 40 mg PO





Dispo: Please keep patient NPO after midnight for planned upper endoscopy for 

tomorrow. Monitor on floor.





Case was discussed and reviewed with Attending Physician, Dr. Enriquez.














<Aayush Enriquez - Last Filed: 09/20/18 16:08>





Objective





- Vital Signs/Intake and Output


Vital Signs (last 24 hours): 


 











Temp Pulse Resp BP Pulse Ox


 


 98.3 F   66   18   106/72   100 


 


 09/20/18 11:40  09/20/18 11:40  09/20/18 11:40  09/20/18 11:40  09/20/18 11:40








Intake and Output: 


 











 09/20/18 09/20/18





 06:59 18:59


 


Intake Total 900 


 


Balance 900 














- Labs


Labs: 


 





 09/20/18 06:20 





 09/20/18 06:20 











Attending/Attestation





- Attestation


I have personally seen and examined this patient.: Yes


I have fully participated in the care of the patient.: Yes


I have reviewed all pertinent clinical information, including history, physical 

exam and plan: Yes


Notes (Text): 





09/20/18 16:07








Attending note;





Patient seen and examined with resident.


Patient is alert and awake.


Denies any nausea or vomiting.





Patient is a 47 year old female with  PMH of rheumatoid arthritis, fibromyalgia 

,HTN, asthma, Raynaud's syndrome, previous history of pulmonary embolism was 

admitted for vomiting. Patient states she has experienced vomiting without 

nausea for the past 2 years on and off. Currently the frequency of vomiting is 

increasing.


Admitted for vomiting/dehydration and hypokalemia.


Started on IV fluids with potassium supplementation.


Potassium level is normal.





GI evaluation  appreciated .CT abdomen and pelvis showed moderate constipation .


Plan for  EGD tomorrow.


History of marijuana use.





History of rheumatoid arthritis; avoid NSAIDs. Continue Protonix.


Continue Savella nad xeljanz per  rheumatologist.





History of asthma; started on DuoNeb treatment. Started on IV steroidal for 

acute exacerbation.





Upon discharge the patient will follow-up with PMD Dr. Toledo.

## 2018-09-19 NOTE — RAD
Date of service: 



09/18/2018



HISTORY:

 hx of RA; at risk for serositis 



COMPARISON:

02/15/2015



TECHNIQUE:

Chest PA and lateral



FINDINGS:



LUNGS:

No active pulmonary disease.



PLEURA:

No significant pleural effusion identified. No pneumothorax apparent.



CARDIOVASCULAR:

Normal.



OSSEOUS STRUCTURES:

No significant abnormalities.



VISUALIZED UPPER ABDOMEN:

Normal.



OTHER FINDINGS:

None.



IMPRESSION:

No active disease.

## 2018-09-19 NOTE — CON
DATE:  09/19/2018



GASTROENTEROLOGY CONSULTATION



REQUESTING PHYSICIAN:  Aayush Enriquez MD.



REASON FOR CONSULTATION:  I have been asked to see this 47-year-old female

with a history of rheumatoid arthritis, Raynaud syndrome, fibromyalgia,

asthma, hypertension, and pulmonary embolus who comes to the hospital with

increased frequency of vomiting.  The patient apparently has had

intermittent vomiting for the last 2 years.  She states that this averages

1 to 2 times a month.  Over the last several days, the patient has had an

increased frequency of vomiting.  She vomited three times the day prior to

admission to the hospital.  She denies any abdominal pain with a vomiting,

hematemesis, melena, rectal bleeding, fevers, or chills.  The patient does

smoke marijuana at night intermittently to help her sleep.  The patient

states that she does not believe that there is any relationship between the

vomiting and her smoking marijuana.



PAST MEDICAL HISTORY:  As above.  Again, the patient has a history of

rheumatoid arthritis, Raynaud syndrome, fibromyalgia, asthma, hypertension,

and pulmonary embolus.



PAST SURGICAL HISTORY:  Notable for carpal tunnel surgery, appendectomy,

and tubal ligation.



FAMILY HISTORY:  Noncontributory.



SOCIAL HISTORY:  She denies cigarette smoking or alcohol use.  She smokes

marijuana intermittently.



REVIEW OF SYSTEMS:  A 14-point review of systems is notable for vomiting

without abdominal pain.



MEDICATIONS AT HOME:  Include hydrochlorothiazide, Xeljanz, prednisone,

Singulair, Savella, Duexis, and aspirin.



PHYSICAL EXAMINATION:

GENERAL:  Thin female, lying in bed, in no acute distress.

VITAL SIGNS:  Reveal temperature of 99, blood pressure 119/76, heart rate

is 70, BMI is 21.6.

HEENT:  Reveal sclerae to be white.  Conjunctivae pale.

NECK:  Supple.

CHEST:  Lungs are clear.

HEART:  Reveals a regular rate and rhythm.

ABDOMEN:  Soft, nontender.

EXTREMITIES:  Show no edema.



LABORATORY DATA:  Reveals BUN 10, creatinine 0.6.  AST, ALT, alk phos are

all normal.  CBC reveals white blood cell count 6.3, hemoglobin 11.6,

platelet count 345,000.



IMPRESSION:  A 47-year-old female with chronic vomiting, known marijuana

use with recent increase in frequency of her vomiting.  I suspect that her

vomiting is secondary to her marijuana use.  She denies any abdominal pain,

hematemesis, or rectal bleeding.



RECOMMENDATIONS:

1.  We will request a CT scan of the abdomen and pelvis.

2.  We will schedule the patient for an upper endoscopy for the morning.







__________________________________________

Navdeep Santizo MD



DD:  09/19/2018 7:47:18

DT:  09/19/2018 7:49:29

Job # [id number]

## 2018-09-19 NOTE — CT
Date of service: 



09/19/2018



PROCEDURE:  CT Abdomen and Pelvis without intravenous contrast



HISTORY:

vomitting



COMPARISON:

None.



TECHNIQUE:

Without contrast.. 



Contrast dose: 



Radiation dose:



Total exam DLP = 288 mGy-cm.



This CT exam was performed using one or more of the following dose 

reduction techniques: Automated exposure control, adjustment of the 

mA and/or kV according to patient size, and/or use of iterative 

reconstruction technique.



FINDINGS:



LOWER THORAX:

Unremarkable. 



LIVER:

Unremarkable. No gross lesion or ductal dilatation.  



GALLBLADDER AND BILE DUCTS:

Unremarkable. 



PANCREAS:

Unremarkable. No gross lesion or ductal dilatation.



SPLEEN:

Unremarkable. 



ADRENALS:

Unremarkable. No mass. 



KIDNEYS AND URETERS:

Unremarkable. No hydronephrosis. No solid mass. 



VASCULATURE:

Unremarkable. No aortic aneurysm. 



BOWEL:

Unremarkable. No obstruction. No gross mural thickening. Moderate 

constipation



APPENDIX:

Unremarkable. Normal appendix. 



PERITONEUM:

Unremarkable. No free fluid. No free air. 



LYMPH NODES:

Unremarkable. No enlarged lymph nodes. 



BLADDER:

Unremarkable. 



REPRODUCTIVE:

Unremarkable. 



BONES:

No acute fracture. 



OTHER FINDINGS:

None.



IMPRESSION:

Moderate constipation.



No acute intra-abdominal findings

## 2018-09-20 VITALS
SYSTOLIC BLOOD PRESSURE: 106 MMHG | RESPIRATION RATE: 18 BRPM | DIASTOLIC BLOOD PRESSURE: 72 MMHG | HEART RATE: 66 BPM | OXYGEN SATURATION: 100 %

## 2018-09-20 LAB
ALBUMIN SERPL-MCNC: 4.5 G/DL (ref 3–4.8)
ALBUMIN/GLOB SERPL: 1.3 {RATIO} (ref 1.1–1.8)
ALT SERPL-CCNC: 20 U/L (ref 7–56)
AST SERPL-CCNC: 29 U/L (ref 14–36)
BASOPHILS # BLD AUTO: 0.01 K/MM3 (ref 0–2)
BASOPHILS NFR BLD: 0.1 % (ref 0–3)
BUN SERPL-MCNC: 10 MG/DL (ref 7–21)
CALCIUM SERPL-MCNC: 9.5 MG/DL (ref 8.4–10.5)
EOSINOPHIL # BLD: 0 10*3/UL (ref 0–0.7)
EOSINOPHIL NFR BLD: 0 % (ref 1.5–5)
ERYTHROCYTE [DISTWIDTH] IN BLOOD BY AUTOMATED COUNT: 15.5 % (ref 11.5–14.5)
GFR NON-AFRICAN AMERICAN: > 60
GRANULOCYTES # BLD: 9.55 10*3/UL (ref 1.4–6.5)
GRANULOCYTES NFR BLD: 85.2 % (ref 50–68)
HGB BLD-MCNC: 12.2 G/DL (ref 12–16)
LYMPHOCYTES # BLD: 0.9 10*3/UL (ref 1.2–3.4)
LYMPHOCYTES NFR BLD AUTO: 8.4 % (ref 22–35)
MCH RBC QN AUTO: 29.3 PG (ref 25–35)
MCHC RBC AUTO-ENTMCNC: 32.8 G/DL (ref 31–37)
MCV RBC AUTO: 89.2 FL (ref 80–105)
MONOCYTES # BLD AUTO: 0.7 10*3/UL (ref 0.1–0.6)
MONOCYTES NFR BLD: 6.3 % (ref 1–6)
PLATELET # BLD: 375 10^3/UL (ref 120–450)
PMV BLD AUTO: 9.9 FL (ref 7–11)
RBC # BLD AUTO: 4.17 10^6/UL (ref 3.5–6.1)
WBC # BLD AUTO: 11.2 10^3/UL (ref 4.5–11)

## 2018-09-20 RX ADMIN — METHYLPREDNISOLONE SODIUM SUCCINATE SCH MG: 40 INJECTION, POWDER, FOR SOLUTION INTRAMUSCULAR; INTRAVENOUS at 11:44

## 2018-09-20 NOTE — CP.PCM.DIS
<Kobe Foley - Last Filed: 09/20/18 16:54>





Provider





- Provider


Date of Admission: 


09/18/18 17:23





Attending physician: 


Aayush Enriquez MD





Primary care physician: 


Rodney Toledo DO





Consults: 





GI: Dr. Santizo


Time Spent in preparation of Discharge (in minutes): 40





Hospital Course





- Lab Results


Lab Results: 


 Most Recent Lab Values











WBC  11.2 10^3/ul (4.5-11.0)  H D 09/20/18  06:20    


 


RBC  4.17 10^6/uL (3.5-6.1)   09/20/18  06:20    


 


Hgb  12.2 g/dL (12.0-16.0)   09/20/18  06:20    


 


Hct  37.2 % (36.0-48.0)   09/20/18  06:20    


 


MCV  89.2 fl (80.0-105.0)   09/20/18  06:20    


 


MCH  29.3 pg (25.0-35.0)   09/20/18  06:20    


 


MCHC  32.8 g/dl (31.0-37.0)   09/20/18  06:20    


 


RDW  15.5 % (11.5-14.5)  H  09/20/18  06:20    


 


Plt Count  375 10^3/uL (120.0-450.0)   09/20/18  06:20    


 


MPV  9.9 fl (7.0-11.0)   09/20/18  06:20    


 


Gran %  85.2 % (50.0-68.0)  H  09/20/18  06:20    


 


Lymph % (Auto)  8.4 % (22.0-35.0)  L  09/20/18  06:20    


 


Mono % (Auto)  6.3 % (1.0-6.0)  H  09/20/18  06:20    


 


Eos % (Auto)  0.0 % (1.5-5.0)  L  09/20/18  06:20    


 


Baso % (Auto)  0.1 % (0.0-3.0)   09/20/18  06:20    


 


Gran #  9.55  (1.4-6.5)  H  09/20/18  06:20    


 


Lymph # (Auto)  0.9  (1.2-3.4)  L  09/20/18  06:20    


 


Mono # (Auto)  0.7  (0.1-0.6)  H  09/20/18  06:20    


 


Eos # (Auto)  0.0  (0.0-0.7)   09/20/18  06:20    


 


Baso # (Auto)  0.01 K/mm3 (0.0-2.0)   09/20/18  06:20    


 


Sodium  140 mmol/L (132-148)   09/20/18  06:20    


 


Potassium  4.9 mmol/L (3.6-5.0)   09/20/18  06:20    


 


Chloride  107 mmol/L ()   09/20/18  06:20    


 


Carbon Dioxide  25 mmol/L (21-33)   09/20/18  06:20    


 


Anion Gap  13  (10-20)   09/20/18  06:20    


 


BUN  10 mg/dL (7-21)   09/20/18  06:20    


 


Creatinine  0.6 mg/dl (0.7-1.2)  L  09/20/18  06:20    


 


Est GFR ( Amer)  > 60   09/20/18  06:20    


 


Est GFR (Non-Af Amer)  > 60   09/20/18  06:20    


 


Random Glucose  115 mg/dL ()  H  09/20/18  06:20    


 


Calcium  9.5 mg/dL (8.4-10.5)   09/20/18  06:20    


 


Phosphorus  2.6 mg/dL (2.5-4.5)   09/19/18  06:30    


 


Magnesium  1.9 mg/dL (1.7-2.2)   09/19/18  06:30    


 


Total Bilirubin  0.5 mg/dL (0.2-1.3)   09/20/18  06:20    


 


AST  29 U/L (14-36)   09/20/18  06:20    


 


ALT  20 U/L (7-56)   09/20/18  06:20    


 


Alkaline Phosphatase  48 U/L ()   09/20/18  06:20    


 


Total Protein  7.8 g/dL (5.8-8.3)   09/20/18  06:20    


 


Albumin  4.5 g/dL (3.0-4.8)   09/20/18  06:20    


 


Globulin  3.3 gm/dL  09/20/18  06:20    


 


Albumin/Globulin Ratio  1.3  (1.1-1.8)   09/20/18  06:20    


 


Procalcitonin  0.05 NG/ML (0.19-0.49)  L  09/19/18  07:30    


 


Urine Color  Light yellow  (YELLOW)   09/18/18  16:05    


 


Urine Appearance  Clear  (CLEAR)   09/18/18  16:05    


 


Urine pH  7.0  (4.7-8.0)   09/18/18  16:05    


 


Ur Specific Gravity  <= 1.005  (1.005-1.035)   09/18/18  16:05    


 


Urine Protein  Negative mg/dL (<30 mg/dL)   09/18/18  16:05    


 


Urine Glucose (UA)  Negative mg/dL (NEGATIVE)   09/18/18  16:05    


 


Urine Ketones  Negative mg/dL (NEGATIVE)   09/18/18  16:05    


 


Urine Blood  Negative  (NEGATIVE)   09/18/18  16:05    


 


Urine Nitrate  Negative  (NEGATIVE)   09/18/18  16:05    


 


Urine Bilirubin  Negative  (NEGATIVE)   09/18/18  16:05    


 


Urine Urobilinogen  0.2 E.U./dL (<1 E.U./dL)   09/18/18  16:05    


 


Ur Leukocyte Esterase  Negative Lore/uL (NEGATIVE)   09/18/18  16:05    


 


Urine HCG, Qual  Negative  (NEGATIVE)   09/19/18  08:15    


 


Urine Opiates Screen  Negative  (NEGATIVE)   09/19/18  08:15    


 


Urine Methadone Screen  Negative  (NEGATIVE)   09/19/18  08:15    


 


Ur Barbiturates Screen  Negative  (NEGATIVE)   09/19/18  08:15    


 


Ur Phencyclidine Scrn  Negative  (NEGATIVE)   09/19/18  08:15    


 


Ur Amphetamines Screen  Negative  (NEGATIVE)   09/19/18  08:15    


 


U Benzodiazepines Scrn  Negative  (NEGATIVE)   09/19/18  08:15    


 


U Oth Cocaine Metabols  Negative  (NEGATIVE)   09/19/18  08:15    


 


U Cannabinoids Screen  Positive  (NEGATIVE)  H  09/19/18  08:15    














- Hospital Course


Hospital Course: 





Kobe Foley, PGY1 Discharge Summary for Dr. Enriquez





Hospital Admission:





Patient is a 47 year old female with a PMHx of RA, fibromyalgia, HTN, asthma, OA

, Raynaud's Syndrome, PE who presents to the ED on 9/18 complaining of 

vomiting. Patient states she has experienced vomiting without nausea for the 

past 2 years. She has had 2-3 episodes of vomiting per month up until this time

, in which it has worsened. This prompted her to present to the ED. She denies 

abdominal pain, fever, nausea during these episodes. Patient admits to 

occasional marijuana use for many years. In the ED, EKG was NSR. CXR showed no 

active disease. Patient was admitted to med/surg for worsening vomiting. 





Patient examined by the medical team. She initially presented with hypokalemia 

likely due to her vomiting; her K was repleted. She also had some wheezing on 

lung exam and was given treatment for her asthma. GI was consulted. Patient had 

no episodes of emesis during her actual hospital course. Her labs were also 

stable and she had no signs of active bleed or infection. GI recommended Abdomen

/Pelvis CT with oral contrast. CT findings showed moderate constipation with no 

acute intra-abdominal findings. GI recommended that she go for an upper 

endoscopy. Results of the upper endoscopy on 9/20 showed gastritis. Patient has 

been hemodynamically stable and is safe for discharge.








Upon Discharge:





Patient is cleared by GI for discharge. 


Patient will follow up with her PMD and a Gastroenterologist upon discharge.


Patient will c/w her home meds as prescribed. She is also given zofran, protonix

, and a medrol dose pack upon discharge.





Case was discussed and reviewed with Attending Physician, Dr. Enriquez








Discharge Exam





- Head Exam


Head Exam: ATRAUMATIC, NORMAL INSPECTION, NORMOCEPHALIC





- Eye Exam


Eye Exam: EOMI, Normal appearance, PERRL


Pupil Exam: NORMAL ACCOMODATION, PERRL





- ENT Exam


ENT Exam: Mucous Membranes Moist, Normal Exam





- Neck Exam


Neck exam: Full Rom, Normal Inspection





- Respiratory Exam


Respiratory Exam: Clear to PA & Lateral, NORMAL BREATHING PATTERN, 

UNREMARKABLE.  absent: Chest Wall Tenderness, Rales, Rhonchi, Wheezes, 

Respiratory Distress





- Cardiovascular Exam


Cardiovascular Exam: REGULAR RHYTHM, +S1, +S2





- GI/Abdominal Exam


GI & Abdominal Exam: Normal Bowel Sounds





- Extremities Exam


Extremities exam: full ROM, normal capillary refill, normal inspection, pedal 

pulses present





- Back Exam


Back exam: NORMAL INSPECTION





- Neurological Exam


Neurological exam: Alert, CN II-XII Intact, Normal Gait, Oriented x3, Reflexes 

Normal





- Psychiatric Exam


Psychiatric exam: Normal Affect, Normal Mood





- Skin


Skin Exam: Dry, Intact, Normal Color, Warm





Discharge Plan





- Discharge Medications


Prescriptions: 


Methylprednisolone [Medrol Dose Pack (21 tabs)] 4 mg PO ASDIR #21 mg


Ondansetron HCl [Zofran] 4 mg PO Q8 7 Days #21 tablet


Pantoprazole [Protonix EC Tab] 40 mg PO ACB 7 Days #7 ect





- Follow Up Plan


Condition: STABLE


Disposition: HOME/ ROUTINE


Instructions:  Gastritis (DC), Ulcer and Gastritis Diet


Additional Instructions: 


1. Please follow up with your PMD within 3-4 days of discharge.


2. Please follow up with your Gastroenterologist (Dr. Santizo) within 1 week of 

discharge.


3. Please resume your home medications as prescribed.


4. Please start on these new medications: Zofran 4 mg every 8 hours as needed 

for a total of 7 days, Protonix 40 mg in the morning daily for a total of 7 days

, Medrol dose pack as prescribed. 


5. Please return to the ED if your symptoms reoccur.  


Referrals: 


Navdeep Santizo MD [Staff Provider] - 


Rodney Toledo DO [Primary Care Provider] - 





<Aayush Enriquez - Last Filed: 09/21/18 16:18>





Provider





- Provider


Date of Admission: 


09/18/18 17:23





Attending physician: 


Aayush Enriquez MD





Primary care physician: 


Rodney Toledo DO








Hospital Course





- Lab Results


Lab Results: 


 Most Recent Lab Values











WBC  11.2 10^3/ul (4.5-11.0)  H D 09/20/18  06:20    


 


RBC  4.17 10^6/uL (3.5-6.1)   09/20/18  06:20    


 


Hgb  12.2 g/dL (12.0-16.0)   09/20/18  06:20    


 


Hct  37.2 % (36.0-48.0)   09/20/18  06:20    


 


MCV  89.2 fl (80.0-105.0)   09/20/18  06:20    


 


MCH  29.3 pg (25.0-35.0)   09/20/18  06:20    


 


MCHC  32.8 g/dl (31.0-37.0)   09/20/18  06:20    


 


RDW  15.5 % (11.5-14.5)  H  09/20/18  06:20    


 


Plt Count  375 10^3/uL (120.0-450.0)   09/20/18  06:20    


 


MPV  9.9 fl (7.0-11.0)   09/20/18  06:20    


 


Gran %  85.2 % (50.0-68.0)  H  09/20/18  06:20    


 


Lymph % (Auto)  8.4 % (22.0-35.0)  L  09/20/18  06:20    


 


Mono % (Auto)  6.3 % (1.0-6.0)  H  09/20/18  06:20    


 


Eos % (Auto)  0.0 % (1.5-5.0)  L  09/20/18  06:20    


 


Baso % (Auto)  0.1 % (0.0-3.0)   09/20/18  06:20    


 


Gran #  9.55  (1.4-6.5)  H  09/20/18  06:20    


 


Lymph # (Auto)  0.9  (1.2-3.4)  L  09/20/18  06:20    


 


Mono # (Auto)  0.7  (0.1-0.6)  H  09/20/18  06:20    


 


Eos # (Auto)  0.0  (0.0-0.7)   09/20/18  06:20    


 


Baso # (Auto)  0.01 K/mm3 (0.0-2.0)   09/20/18  06:20    


 


Sodium  140 mmol/L (132-148)   09/20/18  06:20    


 


Potassium  4.9 mmol/L (3.6-5.0)   09/20/18  06:20    


 


Chloride  107 mmol/L ()   09/20/18  06:20    


 


Carbon Dioxide  25 mmol/L (21-33)   09/20/18  06:20    


 


Anion Gap  13  (10-20)   09/20/18  06:20    


 


BUN  10 mg/dL (7-21)   09/20/18  06:20    


 


Creatinine  0.6 mg/dl (0.7-1.2)  L  09/20/18  06:20    


 


Est GFR ( Amer)  > 60   09/20/18  06:20    


 


Est GFR (Non-Af Amer)  > 60   09/20/18  06:20    


 


Random Glucose  115 mg/dL ()  H  09/20/18  06:20    


 


Calcium  9.5 mg/dL (8.4-10.5)   09/20/18  06:20    


 


Phosphorus  2.6 mg/dL (2.5-4.5)   09/19/18  06:30    


 


Magnesium  1.9 mg/dL (1.7-2.2)   09/19/18  06:30    


 


Total Bilirubin  0.5 mg/dL (0.2-1.3)   09/20/18  06:20    


 


AST  29 U/L (14-36)   09/20/18  06:20    


 


ALT  20 U/L (7-56)   09/20/18  06:20    


 


Alkaline Phosphatase  48 U/L ()   09/20/18  06:20    


 


Total Protein  7.8 g/dL (5.8-8.3)   09/20/18  06:20    


 


Albumin  4.5 g/dL (3.0-4.8)   09/20/18  06:20    


 


Globulin  3.3 gm/dL  09/20/18  06:20    


 


Albumin/Globulin Ratio  1.3  (1.1-1.8)   09/20/18  06:20    


 


Procalcitonin  0.05 NG/ML (0.19-0.49)  L  09/19/18  07:30    


 


Urine Color  Light yellow  (YELLOW)   09/18/18  16:05    


 


Urine Appearance  Clear  (CLEAR)   09/18/18  16:05    


 


Urine pH  7.0  (4.7-8.0)   09/18/18  16:05    


 


Ur Specific Gravity  <= 1.005  (1.005-1.035)   09/18/18  16:05    


 


Urine Protein  Negative mg/dL (<30 mg/dL)   09/18/18  16:05    


 


Urine Glucose (UA)  Negative mg/dL (NEGATIVE)   09/18/18  16:05    


 


Urine Ketones  Negative mg/dL (NEGATIVE)   09/18/18  16:05    


 


Urine Blood  Negative  (NEGATIVE)   09/18/18  16:05    


 


Urine Nitrate  Negative  (NEGATIVE)   09/18/18  16:05    


 


Urine Bilirubin  Negative  (NEGATIVE)   09/18/18  16:05    


 


Urine Urobilinogen  0.2 E.U./dL (<1 E.U./dL)   09/18/18  16:05    


 


Ur Leukocyte Esterase  Negative Lore/uL (NEGATIVE)   09/18/18  16:05    


 


Urine HCG, Qual  Negative  (NEGATIVE)   09/19/18  08:15    


 


Urine Opiates Screen  Negative  (NEGATIVE)   09/19/18  08:15    


 


Urine Methadone Screen  Negative  (NEGATIVE)   09/19/18  08:15    


 


Ur Barbiturates Screen  Negative  (NEGATIVE)   09/19/18  08:15    


 


Ur Phencyclidine Scrn  Negative  (NEGATIVE)   09/19/18  08:15    


 


Ur Amphetamines Screen  Negative  (NEGATIVE)   09/19/18  08:15    


 


U Benzodiazepines Scrn  Negative  (NEGATIVE)   09/19/18  08:15    


 


U Oth Cocaine Metabols  Negative  (NEGATIVE)   09/19/18  08:15    


 


U Cannabinoids Screen  Positive  (NEGATIVE)  H  09/19/18  08:15    














Attending/Attestation





- Attestation


I have personally seen and examined this patient.: Yes


I have fully participated in the care of the patient.: Yes


I have reviewed all pertinent clinical information, including history, physical 

exam and plan: Yes


Notes (Text): 





09/21/18 16:16





Attending note;





Patient seen and examined with resident.


Patient is alert and awake.





Status post EGD today.


EGD showed gastritis.





Patient is a 47 year old female with  PMH of rheumatoid arthritis, fibromyalgia 

,HTN, asthma, Raynaud's syndrome, previous history of pulmonary embolism was 

admitted for vomiting. Patient states she has experienced vomiting without 

nausea for the past 2 years on and off. 





Vomiting resolved. Status post EGD.


EGD showed gastritis.


Continue Protonix daily. continue zofran prn. 





GI evaluation  appreciated .CT abdomen and pelvis showed moderate constipation .


History of marijuana use.





History of rheumatoid arthritis; avoid NSAIDs. Continue Protonix.


Continue Savella nad xeljanz per  rheumatologist.





History of asthma; patient will be discharged home with Medrol Dosepak.





Upon discharge the patient will follow-up with PMD Dr. Toledo.

## 2019-01-29 ENCOUNTER — HOSPITAL ENCOUNTER (OUTPATIENT)
Dept: HOSPITAL 42 - ED | Age: 49
Setting detail: OBSERVATION
LOS: 2 days | Discharge: HOME | End: 2019-01-31
Attending: FAMILY MEDICINE | Admitting: FAMILY MEDICINE
Payer: MEDICARE

## 2019-01-29 VITALS — BODY MASS INDEX: 24.1 KG/M2

## 2019-01-29 DIAGNOSIS — M79.7: ICD-10-CM

## 2019-01-29 DIAGNOSIS — Z79.82: ICD-10-CM

## 2019-01-29 DIAGNOSIS — I10: ICD-10-CM

## 2019-01-29 DIAGNOSIS — M06.9: ICD-10-CM

## 2019-01-29 DIAGNOSIS — Z86.711: ICD-10-CM

## 2019-01-29 DIAGNOSIS — J45.20: ICD-10-CM

## 2019-01-29 DIAGNOSIS — E78.5: ICD-10-CM

## 2019-01-29 DIAGNOSIS — B96.20: ICD-10-CM

## 2019-01-29 DIAGNOSIS — I73.00: ICD-10-CM

## 2019-01-29 DIAGNOSIS — R50.9: Primary | ICD-10-CM

## 2019-01-29 DIAGNOSIS — E87.6: ICD-10-CM

## 2019-01-29 DIAGNOSIS — R05: ICD-10-CM

## 2019-01-29 DIAGNOSIS — N39.0: ICD-10-CM

## 2019-01-29 LAB
ALBUMIN SERPL-MCNC: 4.8 G/DL (ref 3–4.8)
ALBUMIN/GLOB SERPL: 1.3 {RATIO} (ref 1.1–1.8)
ALT SERPL-CCNC: 32 U/L (ref 7–56)
APPEARANCE UR: (no result)
AST SERPL-CCNC: 47 U/L (ref 14–36)
BACTERIA #/AREA URNS HPF: (no result) /HPF
BASOPHILS # BLD AUTO: 0.01 K/MM3 (ref 0–2)
BASOPHILS NFR BLD: 0.2 % (ref 0–3)
BILIRUB UR-MCNC: NEGATIVE MG/DL
BUN SERPL-MCNC: 12 MG/DL (ref 7–21)
BUN SERPL-MCNC: 12 MG/DL (ref 7–21)
CALCIUM SERPL-MCNC: 8.5 MG/DL (ref 8.4–10.5)
CALCIUM SERPL-MCNC: 9.2 MG/DL (ref 8.4–10.5)
COLOR UR: YELLOW
EOSINOPHIL # BLD: 0 10*3/UL (ref 0–0.7)
EOSINOPHIL NFR BLD: 0 % (ref 1.5–5)
ERYTHROCYTE [DISTWIDTH] IN BLOOD BY AUTOMATED COUNT: 16.6 % (ref 11.5–14.5)
GFR NON-AFRICAN AMERICAN: > 60
GFR NON-AFRICAN AMERICAN: > 60
GLUCOSE UR STRIP-MCNC: NEGATIVE MG/DL
HGB BLD-MCNC: 12.8 G/DL (ref 12–16)
LEUKOCYTE ESTERASE UR-ACNC: NEGATIVE LEU/UL
LYMPHOCYTES # BLD: 0.6 10*3/UL (ref 1.2–3.4)
LYMPHOCYTES NFR BLD AUTO: 9.4 % (ref 22–35)
MCH RBC QN AUTO: 28.6 PG (ref 25–35)
MCHC RBC AUTO-ENTMCNC: 34.3 G/DL (ref 31–37)
MCV RBC AUTO: 83.3 FL (ref 80–105)
MONOCYTES # BLD AUTO: 0.8 10*3/UL (ref 0.1–0.6)
MONOCYTES NFR BLD: 11.8 % (ref 1–6)
PH UR STRIP: 6.5 [PH] (ref 4.7–8)
PLATELET # BLD: 214 10^3/UL (ref 120–450)
PMV BLD AUTO: 9.3 FL (ref 7–11)
PROT UR STRIP-MCNC: 100 MG/DL
RBC # BLD AUTO: 4.48 10^6/UL (ref 3.5–6.1)
RBC # UR STRIP: NEGATIVE /UL
RBC #/AREA URNS HPF: (no result) /HPF (ref 0–2)
SP GR UR STRIP: 1.02 (ref 1–1.03)
TROPONIN I SERPL-MCNC: < 0.01 NG/ML
UROBILINOGEN UR STRIP-ACNC: 0.2 E.U./DL
WBC # BLD AUTO: 6.4 10^3/UL (ref 4.5–11)
WBC #/AREA URNS HPF: (no result) /HPF (ref 0–6)

## 2019-01-29 PROCEDURE — 83735 ASSAY OF MAGNESIUM: CPT

## 2019-01-29 PROCEDURE — 82550 ASSAY OF CK (CPK): CPT

## 2019-01-29 PROCEDURE — 87040 BLOOD CULTURE FOR BACTERIA: CPT

## 2019-01-29 PROCEDURE — 80053 COMPREHEN METABOLIC PANEL: CPT

## 2019-01-29 PROCEDURE — 87181 SC STD AGAR DILUTION PER AGT: CPT

## 2019-01-29 PROCEDURE — 94640 AIRWAY INHALATION TREATMENT: CPT

## 2019-01-29 PROCEDURE — 96361 HYDRATE IV INFUSION ADD-ON: CPT

## 2019-01-29 PROCEDURE — 36415 COLL VENOUS BLD VENIPUNCTURE: CPT

## 2019-01-29 PROCEDURE — 85025 COMPLETE CBC W/AUTO DIFF WBC: CPT

## 2019-01-29 PROCEDURE — 87804 INFLUENZA ASSAY W/OPTIC: CPT

## 2019-01-29 PROCEDURE — 84145 PROCALCITONIN (PCT): CPT

## 2019-01-29 PROCEDURE — 71046 X-RAY EXAM CHEST 2 VIEWS: CPT

## 2019-01-29 PROCEDURE — 84484 ASSAY OF TROPONIN QUANT: CPT

## 2019-01-29 PROCEDURE — 84100 ASSAY OF PHOSPHORUS: CPT

## 2019-01-29 PROCEDURE — 83615 LACTATE (LD) (LDH) ENZYME: CPT

## 2019-01-29 PROCEDURE — 96360 HYDRATION IV INFUSION INIT: CPT

## 2019-01-29 PROCEDURE — 87081 CULTURE SCREEN ONLY: CPT

## 2019-01-29 PROCEDURE — 99285 EMERGENCY DEPT VISIT HI MDM: CPT

## 2019-01-29 PROCEDURE — 87449 NOS EACH ORGANISM AG IA: CPT

## 2019-01-29 PROCEDURE — 93005 ELECTROCARDIOGRAM TRACING: CPT

## 2019-01-29 PROCEDURE — 87086 URINE CULTURE/COLONY COUNT: CPT

## 2019-01-29 PROCEDURE — 81001 URINALYSIS AUTO W/SCOPE: CPT

## 2019-01-29 NOTE — CP.PCM.HP
<Augie Dickinson - Last Filed: 01/29/19 14:59>





History of Present Illness





- History of Present Illness


History of Present Illness: 





Augie Dickinson PGY1, History and Physical for Dr Rebecca Donahue





Pt is a 47 yo male with a PMH of RA, fibro, HTN, asthma, OA, raynaud's syndrome,

PE who presents to the ED complaining of a 1 week history of 101 fevers along 

with cough and nasal congestion. Pt complains of generalized weakness. Pt tried 

to use Laura Selzer plus Cold and Flu but it did not help with her symptoms. Pt 

states she has been having trouble eating and drinking because everything tastes

"nasty". Denies sick contacts. Denies travel. Reports chills and headache. 

Denies vomiting, but reports nausea. Denies diarrhea/constipation or urinary 

symptoms. A 12 point ROS was obtained and added to the HPI where appropriate. 





PMH: RA, fibro, HTN, asthma, OA, raynaud's syndrome, PE


PSH: appendectomy, carpal tunnel surgery, tubal ligation


FH: Mother 68 Diverticulitis, Father 46 suicide


SH: Denies tobacco, Marlene alcohol, admits to daily marijuana in the past, lives

in [place], Retired ophthalmology assistant 


Home meds: Duexis BID, Prednisone 5mg daily, xelijenz xr 11m, savella BID, HCTZ 

25mg, ASA 81mg, Singulair 10mg


Allergies: hydrocortisone causes hives


PMD: Dr Toledo


Rheumatologist: Sudha





Present on Admission





- Present on Admission


Any Indicators Present on Admission: No





Review of Systems





- Review of Systems


Review of Systems: 





a 12 point ROS was obtained and added to the HPI where appropriate 





Past Patient History





- Infectious Disease


Hx of Infectious Diseases: None





- Tetanus Immunizations


Tetanus Immunization: Unknown





- Past Medical History & Family History


Past Medical History?: Yes





- Past Social History


Smoking Status: Never Smoked





- CARDIAC


Hx Cardiac Disorders: Yes


Hx Circulatory Problems: Yes


Hx Heart Murmur: Yes


Hx Hypertension: Yes


Hx Peripheral Vascular Disease: Yes


Other/Comment: circulation problems, peripheral neuropathy numbness tingling to 

hands and feet arms and legs, feels like electricity going tleft thumb





- PULMONARY


Hx Respiratory Disorders: Yes (pe 6/16/15 right lung)


Hx Asthma: Yes


Hx Pulmonary Embolism: Yes





- NEUROLOGICAL


Hx Neurological Disorder: Yes


Hx Dizziness:  (denies)


Other/Comment: peripheral neuropathy in arms and legs





- HEENT


Hx HEENT Problems: Yes (sinusitis)





- RENAL


Hx Chronic Kidney Disease: No





- ENDOCRINE/METABOLIC


Hx Endocrine Disorders: No





- HEMATOLOGICAL/ONCOLOGICAL


Hx Blood Transfusions: No





- INTEGUMENTARY


Hx Dermatological Problems: Yes (skin problems)





- MUSCULOSKELETAL/RHEUMATOLOGICAL


Hx Musculoskeletal Disorders: Yes (rheumatoid arthritis)


Hx Rheumatoid Arthritis: Yes





- GASTROINTESTINAL


Hx Gastrointestinal Disorders: Yes


Other/Comment: chronic spontaneous vomiting x 2 yrs on and off





- GENITOURINARY/GYNECOLOGICAL


Hx Genitourinary Disorders: Yes


Hx Urinary Tract Infection: Yes





- PSYCHIATRIC


Hx Psychophysiologic Disorder: No


Hx Substance Use: No





- SURGICAL HISTORY


Hx Tubal Ligation: Yes





- ANESTHESIA


Hx Anesthesia: Yes


Hx Anesthesia Reactions: No


Hx Malignant Hyperthermia: No





Meds


Allergies/Adverse Reactions: 


                                    Allergies











Allergy/AdvReac Type Severity Reaction Status Date / Time


 


hydrocortisone Allergy  RASH Verified 09/18/18 15:50


 


latex Allergy  SHORTNESS Verified 09/18/18 15:50





   OF BREATH  


 


nasal cannula AdvReac  SHORTNESS Uncoded 09/18/18 15:50





   OF BREATH  














Physical Exam





- Constitutional


Appears: No Acute Distress





- Head Exam


Head Exam: ATRAUMATIC, NORMOCEPHALIC





- Eye Exam


Eye Exam: EOMI, PERRL





- ENT Exam


ENT Exam: Mucous Membranes Moist





- Neck Exam


Neck exam: Positive for: Normal Inspection.  Negative for: Tenderness





- Respiratory Exam


Respiratory Exam: Clear to Auscultation Bilateral, NORMAL BREATHING PATTERN.  

absent: Accessory Muscle Use, Respiratory Distress





- Cardiovascular Exam


Cardiovascular Exam: RRR, +S1, +S2.  absent: Diastolic murmur, Systolic Murmur





- GI/Abdominal Exam


GI & Abdominal Exam: Normal Bowel Sounds, Soft.  absent: Tenderness





- Extremities Exam


Extremities exam: Positive for: full ROM, pedal pulses present.  Negative for: 

calf tenderness, pedal edema





- Neurological Exam


Neurological exam: Alert, Oriented x3





- Psychiatric Exam


Psychiatric exam: Normal Affect, Normal Mood





- Skin


Skin Exam: Dry, Intact, Warm





Results





- Vital Signs


Recent Vital Signs: 





                                Last Vital Signs











Temp  99.2 F   01/29/19 13:36


 


Pulse  70   01/29/19 13:36


 


Resp  18   01/29/19 13:36


 


BP  104/55 L  01/29/19 13:36


 


Pulse Ox  96   01/29/19 13:36














- Labs


Result Diagrams: 


                                 01/29/19 10:45





                                 01/29/19 10:45


Labs: 





                         Laboratory Results - last 24 hr











  01/29/19 01/29/19 01/29/19





  10:45 10:45 10:45


 


WBC   6.4 


 


RBC   4.48 


 


Hgb   12.8 


 


Hct   37.3 


 


MCV   83.3  D 


 


MCH   28.6 


 


MCHC   34.3 


 


RDW   16.6 H 


 


Plt Count   214 


 


MPV   9.3 


 


Neut % (Auto)   78.6 H 


 


Lymph % (Auto)   9.4 L 


 


Mono % (Auto)   11.8 H 


 


Eos % (Auto)   0.0 L 


 


Baso % (Auto)   0.2 


 


Lymph # (Auto)   0.6 L 


 


Mono # (Auto)   0.8 H 


 


Eos # (Auto)   0.0 


 


Baso # (Auto)   0.01 


 


Absolute Neuts (auto)   5.02 


 


Sodium  132  


 


Potassium  2.5 L* D  


 


Chloride  90 L  


 


Carbon Dioxide  32  


 


Anion Gap  13  


 


BUN  12  


 


Creatinine  0.7  


 


Est GFR ( Amer)  > 60  


 


Est GFR (Non-Af Amer)  > 60  


 


Random Glucose  106  


 


Calcium  9.2  


 


Magnesium   


 


Total Bilirubin  0.3  


 


AST  47 H D  


 


ALT  32  


 


Alkaline Phosphatase  70  


 


Lactate Dehydrogenase   


 


Total Creatine Kinase   


 


Troponin I   


 


Total Protein  8.5 H  


 


Albumin  4.8  


 


Globulin  3.7  


 


Albumin/Globulin Ratio  1.3  


 


Urine Color   


 


Urine Appearance   


 


Urine pH   


 


Ur Specific Gravity   


 


Urine Protein   


 


Urine Glucose (UA)   


 


Urine Ketones   


 


Urine Blood   


 


Urine Nitrate   


 


Urine Bilirubin   


 


Urine Urobilinogen   


 


Ur Leukocyte Esterase   


 


Urine RBC   


 


Urine WBC   


 


Ur Epithelial Cells   


 


Urine Bacteria   


 


Influenza Typ A,B (EIA)    Negative for flu a/b














  01/29/19 01/29/19





  10:45 13:00


 


WBC  


 


RBC  


 


Hgb  


 


Hct  


 


MCV  


 


MCH  


 


MCHC  


 


RDW  


 


Plt Count  


 


MPV  


 


Neut % (Auto)  


 


Lymph % (Auto)  


 


Mono % (Auto)  


 


Eos % (Auto)  


 


Baso % (Auto)  


 


Lymph # (Auto)  


 


Mono # (Auto)  


 


Eos # (Auto)  


 


Baso # (Auto)  


 


Absolute Neuts (auto)  


 


Sodium  


 


Potassium  


 


Chloride  


 


Carbon Dioxide  


 


Anion Gap  


 


BUN  


 


Creatinine  


 


Est GFR ( Amer)  


 


Est GFR (Non-Af Amer)  


 


Random Glucose  


 


Calcium  


 


Magnesium  1.8 


 


Total Bilirubin  


 


AST  


 


ALT  


 


Alkaline Phosphatase  


 


Lactate Dehydrogenase  541 


 


Total Creatine Kinase  110 


 


Troponin I  < 0.01 


 


Total Protein  


 


Albumin  


 


Globulin  


 


Albumin/Globulin Ratio  


 


Urine Color   Yellow


 


Urine Appearance   Turbid


 


Urine pH   6.5


 


Ur Specific Gravity   1.025


 


Urine Protein   100 H


 


Urine Glucose (UA)   Negative


 


Urine Ketones   >=80


 


Urine Blood   Negative


 


Urine Nitrate   Positive H


 


Urine Bilirubin   Negative


 


Urine Urobilinogen   0.2


 


Ur Leukocyte Esterase   Negative


 


Urine RBC   None


 


Urine WBC   0 - 2


 


Ur Epithelial Cells   3 - 4


 


Urine Bacteria   Many


 


Influenza Typ A,B (EIA)  














Assessment & Plan





- Assessment and Plan (Free Text)


Assessment: 





Pt is a 47 yo male with a PMH of RA, fibro, HTN, asthma, OA, raynaud's syndrome,

PE who presents to the ED complaining of a 1 week history of 101 fevers along 

with cough and nasal congestion.


Plan: 





Fever, Cough, Body Aches


- likely viral etiology


- blood cultures follow up


- MRSA screen follow up


- Urine culture follow up


- legionella follow up


- procal follow up


- tylenol for fever PRN


- tamiflu


- 


- ID consulted





Hypokalemia


- K 2.5


- replete PRN


- monitor BMP


- Mg/ Phos follow up


- will hold HCTZ at this time, pt is normotensive, advised pt to follow up with 

her PMD to possible change this medication





Asthma


- continue home singulair





HTN


- hold HCTZ, pt is normotensive at this time





RA


- prednisone 5mg PO





Fibromyalgia 


- continue Savella





HLD


- pt not on home statin, will hold off on starting at this time





Ppx


- SCD


- protonix








Pt seen, examined, assessment and plan discussed with Dr Rebecca Dickinson PGY1, Internal Medicine Resident





- Date & Time


Date: 01/29/19


Time: 14:59





<Rebecca Donahue R - Last Filed: 01/29/19 18:33>





Results





- Vital Signs


Recent Vital Signs: 





                                Last Vital Signs











Temp  99.0 F   01/29/19 18:28


 


Pulse  72   01/29/19 18:28


 


Resp  18   01/29/19 18:28


 


BP  118/67   01/29/19 18:28


 


Pulse Ox  97   01/29/19 18:28














- Labs


Result Diagrams: 


                                 01/29/19 10:45





                                 01/29/19 18:00


Labs: 





                         Laboratory Results - last 24 hr











  01/29/19 01/29/19 01/29/19





  10:45 10:45 10:45


 


WBC   6.4 


 


RBC   4.48 


 


Hgb   12.8 


 


Hct   37.3 


 


MCV   83.3  D 


 


MCH   28.6 


 


MCHC   34.3 


 


RDW   16.6 H 


 


Plt Count   214 


 


MPV   9.3 


 


Neut % (Auto)   78.6 H 


 


Lymph % (Auto)   9.4 L 


 


Mono % (Auto)   11.8 H 


 


Eos % (Auto)   0.0 L 


 


Baso % (Auto)   0.2 


 


Lymph # (Auto)   0.6 L 


 


Mono # (Auto)   0.8 H 


 


Eos # (Auto)   0.0 


 


Baso # (Auto)   0.01 


 


Absolute Neuts (auto)   5.02 


 


Sodium  132  


 


Potassium  2.5 L* D  


 


Chloride  90 L  


 


Carbon Dioxide  32  


 


Anion Gap  13  


 


BUN  12  


 


Creatinine  0.7  


 


Est GFR ( Amer)  > 60  


 


Est GFR (Non-Af Amer)  > 60  


 


Random Glucose  106  


 


Calcium  9.2  


 


Magnesium   


 


Total Bilirubin  0.3  


 


AST  47 H D  


 


ALT  32  


 


Alkaline Phosphatase  70  


 


Lactate Dehydrogenase   


 


Total Creatine Kinase   


 


Troponin I   


 


Total Protein  8.5 H  


 


Albumin  4.8  


 


Globulin  3.7  


 


Albumin/Globulin Ratio  1.3  


 


Urine Color   


 


Urine Appearance   


 


Urine pH   


 


Ur Specific Gravity   


 


Urine Protein   


 


Urine Glucose (UA)   


 


Urine Ketones   


 


Urine Blood   


 


Urine Nitrate   


 


Urine Bilirubin   


 


Urine Urobilinogen   


 


Ur Leukocyte Esterase   


 


Urine RBC   


 


Urine WBC   


 


Ur Epithelial Cells   


 


Urine Bacteria   


 


Influenza Typ A,B (EIA)    Negative for flu a/b














  01/29/19 01/29/19 01/29/19





  10:45 13:00 18:00


 


WBC   


 


RBC   


 


Hgb   


 


Hct   


 


MCV   


 


MCH   


 


MCHC   


 


RDW   


 


Plt Count   


 


MPV   


 


Neut % (Auto)   


 


Lymph % (Auto)   


 


Mono % (Auto)   


 


Eos % (Auto)   


 


Baso % (Auto)   


 


Lymph # (Auto)   


 


Mono # (Auto)   


 


Eos # (Auto)   


 


Baso # (Auto)   


 


Absolute Neuts (auto)   


 


Sodium    136


 


Potassium    3.0 L


 


Chloride    99


 


Carbon Dioxide    30


 


Anion Gap    10


 


BUN    12


 


Creatinine    0.7


 


Est GFR ( Amer)    > 60


 


Est GFR (Non-Af Amer)    > 60


 


Random Glucose    90


 


Calcium    8.5


 


Magnesium  1.8  


 


Total Bilirubin   


 


AST   


 


ALT   


 


Alkaline Phosphatase   


 


Lactate Dehydrogenase  541  


 


Total Creatine Kinase  110  


 


Troponin I  < 0.01  


 


Total Protein   


 


Albumin   


 


Globulin   


 


Albumin/Globulin Ratio   


 


Urine Color   Yellow 


 


Urine Appearance   Turbid 


 


Urine pH   6.5 


 


Ur Specific Gravity   1.025 


 


Urine Protein   100 H 


 


Urine Glucose (UA)   Negative 


 


Urine Ketones   >=80 


 


Urine Blood   Negative 


 


Urine Nitrate   Positive H 


 


Urine Bilirubin   Negative 


 


Urine Urobilinogen   0.2 


 


Ur Leukocyte Esterase   Negative 


 


Urine RBC   None 


 


Urine WBC   0 - 2 


 


Ur Epithelial Cells   3 - 4 


 


Urine Bacteria   Many 


 


Influenza Typ A,B (EIA)   














Attending/Attestation





- Attestation


I have personally seen and examined this patient.: Yes


I have fully participated in the care of the patient.: Yes


I have reviewed all pertinent clinical information: Yes


Notes (Text): 


Patient seen and examined by me with resident at 2PM in 1/29/19 in the emergency

room.  Case including HPI, physical exam, and assessment and plan discussed with

resident. Agree with above with following additions/corrections.


Patient is a 48-year-old female with past medical history significant for 

hyperlipidemia, mild intermittent asthma, fibromyalgia, rheumatoid arthritis, 

essential hypertension, osteoarthritis, and Raynaud's syndrome the presented to 

the emergency room with fever. Patient states the fevers started on 01/25/2019. 

She states that she has been taking her temperature at home and her highest temp

has been 102 today. Patient states that the only medication she tried at home w

as Laura-Noble cold and flu. She did not try any Tylenol. Denies any sick 

contacts. She states that today is the first day she is having nasal congestion.

Patient also has associated chills and headache. Denies body aches. Patient 

states she has also had a loss of appetite as "I am unable to taste my food and 

everything tasted bad." Patient came to the emergency room today secondary to 

still having a fever. Patient denies any chest pain or shortness of breath. No 

dizziness or lightheadedness. Patient does have some nausea but no vomiting or 

abdominal pain. No dysuria or burning with urination. Recent urinary frequency. 

No diarrhea or constipation. No neck or back pain. Patient does not bleed or 

bruise easily.


12 point review of systems reviewed by me. See above HPI. All other systems nega

tive.


Family history. Mother is alive and has a history of diverticulitis. Father 

passed away at the age of 46 from suicide.


Physical exam:


General: Awake and alert lying in bed in no acute distress


HEENT: Normocephalic, atraumatic. Extraocular muscles intact, pupils equal and 

reactive, no scleral icterus. Oropharynx is pink and moist. No pharyngeal 

erythema or exudate apreciated. Neck is supple. Positive nasal turbinate edema. 

Hearing grossly intact. Nose externally unremarkable.


Cardiovascular: Regular rhythm. Normal S1 and S2. No murmurs, rubs, or gallops 

appreciated


Pulmonary: Normal respiratory effort. No rhonchi, rales, or wheezing 

appreciated.


Gastrointestinal: Soft, nondistended. Nontender. Positive bowel sounds all 4 

quadrants. No guarding. 


Musculoskeletal:  Moves all extremities. No calf tenderness. No edema 

appreciated 


Central nervous system: AAOx3,  CN 2-12 grossly intact. 5 out of 5 muscle 

strength all extremities.


Dermatologic:  Skin warm and dry.


Assessment and plan:  Patient is a 48-year-old female with past medical history 

significant for hyperlipidemia, mild intermittent asthma, fibromyalgia, 

rheumatoid arthritis, essential hypertension, osteoarthritis, and Raynaud's 

syndrome the presented to the emergency room with fever. 


1. Fever. SIRS. Patient has symptoms of the flu, although influenza is negative.

She was started on Tamiflu. Likely viral. Follow up pro-calcitonin and blood 

cultures. Follow-up urine culture. Tylenol as needed for fever. Placed on IV f

luids. ID consulted, follow-up recommendations.


2. Severe hypokalemia. Patient given PO and IV potassium. Follow up repeat blood

work in evening. Replace as needed. Likely secondary to HCTZ, however, patient 

does not want to stop this medication. She states she will discuss with her 

primary care doctor, Dr. Toledo. 


3. Essential hypertension. Home HCTZ held for now secondary to BP being on lower

side and hypokalemia. 


4. RA. Continue home prednisone 5mg Daily. Dontiue home Xeljanz


5. Fibromyalgia. Continue home Savella


6. OA. Continue home Duexis


7. Mild intermittent asthma. Not in acute exacerbation. Continue home singulair.

Placed on nebulizer treatments as needed. 


8. DVT prophylaxis. SCDs and ambulation


9. Patient is a full code. 


Case was discussed in detail with the patient regarding current diagnosis and 

treatment plan. All questions answered.

## 2019-01-29 NOTE — RAD
Date of service: 



01/29/2019



HISTORY:

 cough/fever 



COMPARISON:

9/18/2018



TECHNIQUE:

Chest PA and lateral



FINDINGS:



LUNGS:

No active pulmonary disease.



PLEURA:

No significant pleural effusion identified. No pneumothorax apparent.



CARDIOVASCULAR:

No aortic atherosclerotic calcification present.



Normal cardiac size. No pulmonary vascular congestion. 



OSSEOUS STRUCTURES:

No significant abnormalities.



VISUALIZED UPPER ABDOMEN:

Normal.



OTHER FINDINGS:

None.



IMPRESSION:

No active disease.

## 2019-01-29 NOTE — ED PDOC
Arrival/HPI





- General


Chief Complaint: Fever


Time Seen by Provider: 01/29/19 09:32


Historian: Patient





- History of Present Illness


Narrative History of Present Illness (Text): 





01/29/19 12:01


48-year-old female presents today with a three-day history of cough nasal 

congestion and fevers. Patient complaining of generalized weakness. No chest 

pain or shortness of breath. Patient denies dizziness or weakness. Denies sick 

contacts. No medications have been taken for pain or fever at home. No abdominal

pain. No vomiting or diarrhea. No other complaints





Past Medical History





- Provider Review


Nursing Documentation Reviewed: Yes





- Travel History


Have you recently traveled outside [place] w/in the past 3 mons?: No





- Infectious Disease


Hx of Infectious Diseases: None





- Tetanus Immunization


Tetanus Immunization: Unknown





- Reproductive


Currently Pregnant: No





- Cardiac


Hx Cardiac Disorders: Yes


Hx Circulatory Problems: Yes


Hx Heart Murmur: Yes


Hx Hypertension: Yes


Hx Peripheral Vascular Disease: Yes


Other/Comment: circulation problems, peripheral neuropathy numbness tingling to 

hands and feet arms and legs, feels like electricity going tleft thumb





- Pulmonary


Hx Respiratory Disorders: Yes (pe 6/16/15 right lung)


Hx Asthma: Yes


Hx Pulmonary Embolism: Yes





- Neurological


Hx Neurological Disorder: Yes


Hx Dizziness:  (denies)


Other/Comment: peripheral neuropathy in arms and legs





- HEENT


Hx HEENT Disorder: Yes (sinusitis)





- Renal


Hx Renal Disorder: No





- Endocrine/Metabolic


Hx Endocrine Disorders: No





- Hematological/Oncological


Hx Blood Transfusions: No





- Integumentary


Hx Dermatological Disorder: Yes (skin problems)





- Musculoskeletal/Rheumatological


Hx Musculoskeletal Disorders: Yes (rheumatoid arthritis)


Hx Rheumatoid Arthritis: Yes





- Gastrointestinal


Hx Gastrointestinal Disorders: Yes


Other/Comment: chronic spontaneous vomiting x 2 yrs on and off





- Genitourinary/Gynecological


Hx Genitourinary Disorders: Yes


Hx Urinary Tract Infection: Yes





- Psychiatric


Hx Psychophysiologic Disorder: No


Hx Substance Use: No





- Surgical History


Hx Tubal Ligation: Yes





- Anesthesia


Hx Anesthesia: Yes


Hx Anesthesia Reactions: No


Hx Malignant Hyperthermia: No





- Suicidal Assessment


Feels Threatened In Home Enviroment: No





Family/Social History





- Physician Review


Nursing Documentation Reviewed: Yes


Family/Social History: Unknown Family HX


Smoking Status: Never Smoked


Hx Alcohol Use: No


Hx Substance Use: No


Hx Substance Use Treatment: No





Allergies/Home Meds


Allergies/Adverse Reactions: 


Allergies





hydrocortisone Allergy (Verified 09/18/18 15:50)


   RASH


latex Allergy (Verified 09/18/18 15:50)


   SHORTNESS OF BREATH


nasal cannula Adverse Reaction (Uncoded 09/18/18 15:50)


   SHORTNESS OF BREATH








Home Medications: 


                                    Home Meds











 Medication  Instructions  Recorded  Confirmed


 


Milnacipran HCl [Savella] 50 mg PO BID 01/22/16 01/29/19


 


Aspirin [Lo-Dose Aspirin EC] 81 mg PO DAILY 09/18/18 01/29/19


 


Ibuprofen/Famotidine [Duexis 1 tab PO BID 09/18/18 01/29/19





800-26.6 mg Tablet]   


 


Montelukast [Singulair] 10 mg PO DAILY 09/18/18 01/29/19


 


Tofacitinib Citrate [Xeljanz Xr] 11 mg PO DAILY 09/18/18 01/29/19


 


hydroCHLOROthiazide [Microzide] 50 mg PO DAILY 09/18/18 01/29/19


 


Methylprednisolone [Medrol Dose 5 mg PO DAILY 01/29/19 01/29/19





Pack (21 tabs)]   














Review of Systems





- Review of Systems


Constitutional: Fatigue, Fevers


ENT: Sinus Congestion


Respiratory: Cough.  absent: SOB


Cardiovascular: absent: Chest Pain, Palpitations


Gastrointestinal: absent: Abdominal Pain, Nausea, Vomiting


Skin: absent: Rash, Pruritis


Neurological: Headache.  absent: Dizziness


Psychiatric: absent: Anxiety, Depression





Physical Exam


Vital Signs Reviewed: Yes





Vital Signs











  Temp Pulse Resp BP Pulse Ox


 


 01/29/19 11:12  101.4 F H  78  18  115/45 L  96


 


 01/29/19 10:12  101.6 F H    


 


 01/29/19 09:28  101.6 F H  104 H  18  134/87  97











Temperature: Febrile


Blood Pressure: Normal


Pulse: Tachycardic


Respiratory Rate: Normal


Appearance: Positive for: Well-Appearing, Non-Toxic, Comfortable


Pain Distress: None


Mental Status: Positive for: Alert and Oriented X 3





- Systems Exam


Head: Present: Atraumatic


Conjunctiva: Present: Normal


Ears: Present: Normal, NORMAL TM, Normal Canal


Mouth: Present: Moist Mucous Membranes


Pharnyx: Present: Normal.  No: ERYTHEMA, EXUDATE, TONSILS ENLARGED, Peritonsilar

Swelling, Uvular Deviation, Muffled/Hoarse Voice


Nose (External): Present: Atraumatic


Nose (Internal): Present: Normal Inspection


Neck: Present: Normal Range of Motion, Trachea Midline


Respiratory/Chest: Present: Good Air Exchange, Wheezes, Rhonchi.  No: Clear to 

Auscultation, Respiratory Distress, Accessory Muscle Use


Cardiovascular: Present: Regular Rate and Rhythm


Abdomen: No: Tenderness, Distention, Rebound, Guarding


Neurological: Present: GCS=15, Speech Normal


Skin: Present: Warm, Dry, Normal Color.  No: Rashes


Psychiatric: Present: Alert, Oriented x 3





Medical Decision Making


ED Course and Treatment: 





01/29/19 12:22


48yr old female with flu like symptoms x 3 days. 





cbc; wnl


cmp; K: 2.5





cxr: no infiltrate. 





rapid flu; negative





ekg; normal sinus rhythm at 74 bpm with T-wave inversions in the lateral leads








pt reassessment; pt feeling better. vitals improved. 





all results discussed with patient; 


01/29/19 12:39


case discussed with dr. sorensen. accepts observational status admission to remote 

tele. 





trop wnl





UA; no leukocytes, + nitrates





impression; hypokalemia, fever, cough


admit obs status remote tele. 





Reassessment Condition: Re-examined, Improved





- Lab Interpretations


Lab Results: 





                                        











Total Bilirubin  0.3 mg/dL (0.2-1.3)   01/29/19  10:45    


 


AST  47 U/L (14-36)  H D 01/29/19  10:45    


 


ALT  32 U/L (7-56)   01/29/19  10:45    


 


Alkaline Phosphatase  70 U/L ()   01/29/19  10:45    


 


Total Protein  8.5 g/dL (5.8-8.3)  H  01/29/19  10:45    


 


Albumin  4.8 g/dL (3.0-4.8)   01/29/19  10:45    


 


Globulin  3.7 gm/dL  01/29/19  10:45    


 


Albumin/Globulin Ratio  1.3  (1.1-1.8)   01/29/19  10:45    














- RAD Interpretation


Radiology Orders: 











01/29/19 09:55


CHEST TWO VIEWS (PA/LAT) [RAD] Stat 














- Medication Orders


Current Medication Orders: 














Discontinued Medications





Acetaminophen (Tylenol 325mg Tab)  975 mg PO STAT STA


   Stop: 01/29/19 09:36


   Last Admin: 01/29/19 10:12  Dose: 975 mg





MAR Pain/Vitals


 Document     01/29/19 10:12  KV  (Rec: 01/29/19 10:14  KV  [id number])


     Vitals


      Temperature (97.6 F-99.6 F)                101.6 F


      Temperature Source                         Oral


Re-Assess: MAR Pain/Vitals


 Document     01/29/19 11:12  KV  (Rec: 01/29/19 11:24  KV  [id number])


     Vitals


      Temperature (97.6 F-99.6 F)                101.4 F


      Temperature Source                         Oral





Albuterol/Ipratropium (Duoneb 3 Mg/0.5 Mg (3 Ml) Ud)  3 ml IH STAT STA


   Stop: 01/29/19 09:57


   Last Admin: 01/29/19 10:12  Dose: 3 ml





Sodium Chloride (Sodium Chloride 0.9%)  1,000 mls @ 999 mls/hr IV .Q1H1M STA


   Stop: 01/29/19 10:56


   Last Admin: 01/29/19 10:50  Dose: 999 mls/hr





eMAR Start Stop


 Document     01/29/19 10:50  KV  (Rec: 01/29/19 10:50  KV  [id number])


     Intravenous Solution


      Start Date                                 01/29/19


      Start Time                                 10:50














Disposition/Present on Arrival





- Present on Arrival


Any Indicators Present on Arrival: No


History of DVT/PE: No


History of Uncontrolled Diabetes: No


Urinary Catheter: No


History of Decub. Ulcer: No


History Surgical Site Infection Following: None





- Disposition


Have Diagnosis and Disposition been Completed?: Yes


Diagnosis: 


 Hypokalemia, Fever, Cough





Disposition: HOSPITALIZED


Disposition Time: 12:20


Patient Plan: Observation


Patient Problems: 


                             Current Active Problems











Problem Status Onset


 


Cough Acute 


 


Fever Acute 


 


Hypokalemia Acute 











Condition: FAIR

## 2019-01-30 VITALS — RESPIRATION RATE: 18 BRPM

## 2019-01-30 LAB
ALBUMIN SERPL-MCNC: 4 G/DL (ref 3–4.8)
ALBUMIN/GLOB SERPL: 1.2 {RATIO} (ref 1.1–1.8)
ALT SERPL-CCNC: 34 U/L (ref 7–56)
AST SERPL-CCNC: 41 U/L (ref 14–36)
BASOPHILS # BLD AUTO: 0.01 K/MM3 (ref 0–2)
BASOPHILS NFR BLD: 0.2 % (ref 0–3)
BUN SERPL-MCNC: 9 MG/DL (ref 7–21)
CALCIUM SERPL-MCNC: 8.8 MG/DL (ref 8.4–10.5)
EOSINOPHIL # BLD: 0 10*3/UL (ref 0–0.7)
EOSINOPHIL NFR BLD: 0.8 % (ref 1.5–5)
ERYTHROCYTE [DISTWIDTH] IN BLOOD BY AUTOMATED COUNT: 17.2 % (ref 11.5–14.5)
GFR NON-AFRICAN AMERICAN: > 60
HGB BLD-MCNC: 11.9 G/DL (ref 12–16)
LYMPHOCYTES # BLD: 1.3 10*3/UL (ref 1.2–3.4)
LYMPHOCYTES NFR BLD AUTO: 24.8 % (ref 22–35)
MCH RBC QN AUTO: 27.7 PG (ref 25–35)
MCHC RBC AUTO-ENTMCNC: 32.7 G/DL (ref 31–37)
MCV RBC AUTO: 84.8 FL (ref 80–105)
MONOCYTES # BLD AUTO: 0.6 10*3/UL (ref 0.1–0.6)
MONOCYTES NFR BLD: 11.5 % (ref 1–6)
PLATELET # BLD: 180 10^3/UL (ref 120–450)
PMV BLD AUTO: 9.3 FL (ref 7–11)
RBC # BLD AUTO: 4.29 10^6/UL (ref 3.5–6.1)
WBC # BLD AUTO: 5.1 10^3/UL (ref 4.5–11)

## 2019-01-30 RX ADMIN — IPRATROPIUM BROMIDE AND ALBUTEROL SULFATE SCH ML: .5; 3 SOLUTION RESPIRATORY (INHALATION) at 20:15

## 2019-01-30 RX ADMIN — IPRATROPIUM BROMIDE AND ALBUTEROL SULFATE SCH ML: .5; 3 SOLUTION RESPIRATORY (INHALATION) at 07:22

## 2019-01-30 RX ADMIN — GUAIFENESIN SCH MG: 600 TABLET, EXTENDED RELEASE ORAL at 17:55

## 2019-01-30 RX ADMIN — IPRATROPIUM BROMIDE AND ALBUTEROL SULFATE SCH: .5; 3 SOLUTION RESPIRATORY (INHALATION) at 23:30

## 2019-01-30 NOTE — CP.PCM.CON
<Adalberto Lim - Last Filed: 01/30/19 13:34>





History of Present Illness





- History of Present Illness


History of Present Illness: 





ID Consult Note





48 year old female with past medical history of rheumatoid arthritis, 

fibromyalgia, HTN, asthma, Osteoarthritis, and previous PE presents to the 

hospital after 1 week of fevers and cough. Patient states she initially 

developed a fever and then had cough. Patient denies body aches. Patient tried 

over the counter medication at home, but it did not help. Patient admits to 

occasional nausea, but denies chest pain, shortness of breath, vomiting, 

diarrhea, dysuria. Patient admits to having a fever at home along with chills. 

Patient also admits to decreased oral intake. 








Medical Hx: As above


Sutgical Hx: appendectomy, carpal tunnel surgery, tubal ligation


Family Hx: Diveriticulitis 


Socialx H: Denies alcohol and tobacco. Occasional Marijuana use


Medication: Reviewed, as per MAR


Allergies: Hydrocortisone, Latex





Review of Systems





- Review of Systems


Review of Systems: 





12 point ROS as per HPI, otherwise negative





Past Patient History





- Infectious Disease


Hx of Infectious Diseases: None





- Tetanus Immunizations


Tetanus Immunization: Unknown





- Past Medical History & Family History


Past Medical History?: Yes





- Past Social History


Smoking Status: Never Smoked





- CARDIAC


Hx Cardiac Disorders: Yes


Hx Circulatory Problems: Yes


Hx Heart Murmur: Yes


Hx Hypertension: Yes


Hx Peripheral Vascular Disease: Yes


Other/Comment: circulation problems, peripheral neuropathy numbness tingling to 

hands and feet arms and legs, feels like electricity going tleft thumb





- PULMONARY


Hx Respiratory Disorders: Yes (pe 6/16/15 right lung)


Hx Asthma: Yes


Hx Pulmonary Embolism: Yes





- NEUROLOGICAL


Hx Neurological Disorder: Yes


Hx Dizziness:  (denies)


Other/Comment: peripheral neuropathy in arms and legs





- HEENT


Hx HEENT Problems: Yes (sinusitis)





- RENAL


Hx Chronic Kidney Disease: No





- ENDOCRINE/METABOLIC


Hx Endocrine Disorders: No





- HEMATOLOGICAL/ONCOLOGICAL


Hx Blood Transfusions: No





- INTEGUMENTARY


Hx Dermatological Problems: Yes (skin problems)





- MUSCULOSKELETAL/RHEUMATOLOGICAL


Hx Musculoskeletal Disorders: Yes (rheumatoid arthritis)


Hx Rheumatoid Arthritis: Yes





- GASTROINTESTINAL


Hx Gastrointestinal Disorders: Yes


Other/Comment: chronic spontaneous vomiting x 2 yrs on and off





- GENITOURINARY/GYNECOLOGICAL


Hx Genitourinary Disorders: Yes


Hx Urinary Tract Infection: Yes





- PSYCHIATRIC


Hx Psychophysiologic Disorder: No


Hx Substance Use: No





- SURGICAL HISTORY


Hx Tubal Ligation: Yes





- ANESTHESIA


Hx Anesthesia: Yes


Hx Anesthesia Reactions: No


Hx Malignant Hyperthermia: No





Meds


Allergies/Adverse Reactions: 


                                    Allergies











Allergy/AdvReac Type Severity Reaction Status Date / Time


 


hydrocortisone Allergy  RASH Verified 09/18/18 15:50


 


latex Allergy  SHORTNESS Verified 09/18/18 15:50





   OF BREATH  


 


nasal cannula AdvReac  SHORTNESS Uncoded 09/18/18 15:50





   OF BREATH  














- Medications


Medications: 


                               Current Medications





Acetaminophen (Tylenol 325mg Tab)  650 mg PO Q6H PRN


   PRN Reason: Fever >100.4 F


   Last Admin: 01/29/19 16:50 Dose:  650 mg


Albuterol/Ipratropium (Duoneb 3 Mg/0.5 Mg (3 Ml) Ud)  3 ml IH [id number] [facility]


   Last Admin: 01/30/19 07:22 Dose:  3 ml


Albuterol/Ipratropium (Duoneb 3 Mg/0.5 Mg (3 Ml) Ud)  3 ml IH Q2H PRN


   PRN Reason: Shortness of Breath


Aspirin (Ecotrin)  81 mg PO DAILY [facility]


Home Med (Home Med)  1 unit PO BID [facility]


   Last Admin: 01/29/19 21:06 Dose:  1 unit


Home Med (Home Med)  1 unit PO BID [facility]


   Last Admin: 01/29/19 21:06 Dose:  1 unit


Sodium Chloride (Sodium Chloride 0.9%)  1,000 mls @ 75 mls/hr IV .[id number] [facility]


   Last Admin: 01/30/19 04:57 Dose:  75 mls/hr


Montelukast Sodium (Singulair)  10 mg PO DAILY [facility]


Non-Formulary Medication (Tofacitinib Citrate [Xeljanz Xr])  0 mg PO DAILY [facility]


Oseltamivir Phosphate (Tamiflu Cap)  75 mg PO BID [facility]; Protocol


   Stop: 02/03/19 14:24


   Last Admin: 01/29/19 18:57 Dose:  75 mg


Prednisone (Prednisone Tab)  5 mg PO DAILY [facility]











Physical Exam





- Constitutional


Appears: Non-toxic, No Acute Distress





- Head Exam


Head Exam: ATRAUMATIC, NORMAL INSPECTION, NORMOCEPHALIC





- Eye Exam


Eye Exam: EOMI





- ENT Exam


ENT Exam: Mucous Membranes Moist





- Respiratory Exam


Respiratory Exam: Decreased Breath Sounds, NORMAL BREATHING PATTERN.  absent: 

Rhonchi, Wheezes





- Cardiovascular Exam


Cardiovascular Exam: RRR, +S1, +S2





- GI/Abdominal Exam


GI & Abdominal Exam: Normal Bowel Sounds, Soft.  absent: Tenderness





- Extremities Exam


Extremities exam: Positive for: normal inspection.  Negative for: pedal edema





- Neurological Exam


Neurological exam: Alert, CN II-XII Intact, Oriented x3





- Psychiatric Exam


Psychiatric exam: Normal Affect, Normal Mood





- Skin


Skin Exam: Intact, Normal Color, Warm





Results





- Vital Signs


Recent Vital Signs: 


                                Last Vital Signs











Temp  99.0 F   01/29/19 18:28


 


Pulse  72   01/29/19 18:28


 


Resp  18   01/29/19 18:28


 


BP  118/67   01/29/19 18:28


 


Pulse Ox  97   01/29/19 18:28














- Labs


Result Diagrams: 


                                 01/30/19 04:25





                                 01/30/19 04:25


Labs: 


                         Laboratory Results - last 24 hr











  01/29/19 01/29/19 01/29/19





  10:45 10:45 10:45


 


WBC   6.4 


 


RBC   4.48 


 


Hgb   12.8 


 


Hct   37.3 


 


MCV   83.3  D 


 


MCH   28.6 


 


MCHC   34.3 


 


RDW   16.6 H 


 


Plt Count   214 


 


MPV   9.3 


 


Neut % (Auto)   78.6 H 


 


Lymph % (Auto)   9.4 L 


 


Mono % (Auto)   11.8 H 


 


Eos % (Auto)   0.0 L 


 


Baso % (Auto)   0.2 


 


Lymph # (Auto)   0.6 L 


 


Mono # (Auto)   0.8 H 


 


Eos # (Auto)   0.0 


 


Baso # (Auto)   0.01 


 


Absolute Neuts (auto)   5.02 


 


Sodium  132  


 


Potassium  2.5 L* D  


 


Chloride  90 L  


 


Carbon Dioxide  32  


 


Anion Gap  13  


 


BUN  12  


 


Creatinine  0.7  


 


Est GFR ( Amer)  > 60  


 


Est GFR (Non-Af Amer)  > 60  


 


Random Glucose  106  


 


Calcium  9.2  


 


Phosphorus   


 


Magnesium   


 


Total Bilirubin  0.3  


 


AST  47 H D  


 


ALT  32  


 


Alkaline Phosphatase  70  


 


Lactate Dehydrogenase   


 


Total Creatine Kinase   


 


Troponin I   


 


Total Protein  8.5 H  


 


Albumin  4.8  


 


Globulin  3.7  


 


Albumin/Globulin Ratio  1.3  


 


Procalcitonin   


 


Urine Color   


 


Urine Appearance   


 


Urine pH   


 


Ur Specific Gravity   


 


Urine Protein   


 


Urine Glucose (UA)   


 


Urine Ketones   


 


Urine Blood   


 


Urine Nitrate   


 


Urine Bilirubin   


 


Urine Urobilinogen   


 


Ur Leukocyte Esterase   


 


Urine RBC   


 


Urine WBC   


 


Ur Epithelial Cells   


 


Urine Bacteria   


 


Influenza Typ A,B (EIA)    Negative for flu a/b


 


Ur L.pneumophila Ag   














  01/29/19 01/29/19 01/29/19





  10:45 13:00 16:00


 


WBC   


 


RBC   


 


Hgb   


 


Hct   


 


MCV   


 


MCH   


 


MCHC   


 


RDW   


 


Plt Count   


 


MPV   


 


Neut % (Auto)   


 


Lymph % (Auto)   


 


Mono % (Auto)   


 


Eos % (Auto)   


 


Baso % (Auto)   


 


Lymph # (Auto)   


 


Mono # (Auto)   


 


Eos # (Auto)   


 


Baso # (Auto)   


 


Absolute Neuts (auto)   


 


Sodium   


 


Potassium   


 


Chloride   


 


Carbon Dioxide   


 


Anion Gap   


 


BUN   


 


Creatinine   


 


Est GFR ( Amer)   


 


Est GFR (Non-Af Amer)   


 


Random Glucose   


 


Calcium   


 


Phosphorus   


 


Magnesium  1.8  


 


Total Bilirubin   


 


AST   


 


ALT   


 


Alkaline Phosphatase   


 


Lactate Dehydrogenase  541  


 


Total Creatine Kinase  110  


 


Troponin I  < 0.01  


 


Total Protein   


 


Albumin   


 


Globulin   


 


Albumin/Globulin Ratio   


 


Procalcitonin   


 


Urine Color   Yellow 


 


Urine Appearance   Turbid 


 


Urine pH   6.5 


 


Ur Specific Gravity   1.025 


 


Urine Protein   100 H 


 


Urine Glucose (UA)   Negative 


 


Urine Ketones   >=80 


 


Urine Blood   Negative 


 


Urine Nitrate   Positive H 


 


Urine Bilirubin   Negative 


 


Urine Urobilinogen   0.2 


 


Ur Leukocyte Esterase   Negative 


 


Urine RBC   None 


 


Urine WBC   0 - 2 


 


Ur Epithelial Cells   3 - 4 


 


Urine Bacteria   Many 


 


Influenza Typ A,B (EIA)   


 


Ur L.pneumophila Ag    Negative














  01/29/19 01/29/19 01/30/19





  18:00 18:00 04:25


 


WBC    5.1  D


 


RBC    4.29


 


Hgb    11.9 L


 


Hct    36.4


 


MCV    84.8


 


MCH    27.7


 


MCHC    32.7


 


RDW    17.2 H


 


Plt Count    180


 


MPV    9.3


 


Neut % (Auto)    62.7


 


Lymph % (Auto)    24.8


 


Mono % (Auto)    11.5 H


 


Eos % (Auto)    0.8 L


 


Baso % (Auto)    0.2


 


Lymph # (Auto)    1.3


 


Mono # (Auto)    0.6


 


Eos # (Auto)    0.0


 


Baso # (Auto)    0.01


 


Absolute Neuts (auto)    3.22


 


Sodium   136 


 


Potassium   3.0 L 


 


Chloride   99 


 


Carbon Dioxide   30 


 


Anion Gap   10 


 


BUN   12 


 


Creatinine   0.7 


 


Est GFR ( Amer)   > 60 


 


Est GFR (Non-Af Amer)   > 60 


 


Random Glucose   90 


 


Calcium   8.5 


 


Phosphorus   


 


Magnesium   


 


Total Bilirubin   


 


AST   


 


ALT   


 


Alkaline Phosphatase   


 


Lactate Dehydrogenase   


 


Total Creatine Kinase   


 


Troponin I   


 


Total Protein   


 


Albumin   


 


Globulin   


 


Albumin/Globulin Ratio   


 


Procalcitonin  < 0.05 L  


 


Urine Color   


 


Urine Appearance   


 


Urine pH   


 


Ur Specific Gravity   


 


Urine Protein   


 


Urine Glucose (UA)   


 


Urine Ketones   


 


Urine Blood   


 


Urine Nitrate   


 


Urine Bilirubin   


 


Urine Urobilinogen   


 


Ur Leukocyte Esterase   


 


Urine RBC   


 


Urine WBC   


 


Ur Epithelial Cells   


 


Urine Bacteria   


 


Influenza Typ A,B (EIA)   


 


Ur L.pneumophila Ag   














  01/30/19





  04:25


 


WBC 


 


RBC 


 


Hgb 


 


Hct 


 


MCV 


 


MCH 


 


MCHC 


 


RDW 


 


Plt Count 


 


MPV 


 


Neut % (Auto) 


 


Lymph % (Auto) 


 


Mono % (Auto) 


 


Eos % (Auto) 


 


Baso % (Auto) 


 


Lymph # (Auto) 


 


Mono # (Auto) 


 


Eos # (Auto) 


 


Baso # (Auto) 


 


Absolute Neuts (auto) 


 


Sodium  137


 


Potassium  3.8


 


Chloride  103


 


Carbon Dioxide  31


 


Anion Gap  8 L


 


BUN  9


 


Creatinine  0.7


 


Est GFR ( Amer)  > 60


 


Est GFR (Non-Af Amer)  > 60


 


Random Glucose  84


 


Calcium  8.8


 


Phosphorus  2.8


 


Magnesium  2.2


 


Total Bilirubin  0.2


 


AST  41 H


 


ALT  34


 


Alkaline Phosphatase  53


 


Lactate Dehydrogenase 


 


Total Creatine Kinase 


 


Troponin I 


 


Total Protein  7.3


 


Albumin  4.0


 


Globulin  3.3


 


Albumin/Globulin Ratio  1.2


 


Procalcitonin 


 


Urine Color 


 


Urine Appearance 


 


Urine pH 


 


Ur Specific Gravity 


 


Urine Protein 


 


Urine Glucose (UA) 


 


Urine Ketones 


 


Urine Blood 


 


Urine Nitrate 


 


Urine Bilirubin 


 


Urine Urobilinogen 


 


Ur Leukocyte Esterase 


 


Urine RBC 


 


Urine WBC 


 


Ur Epithelial Cells 


 


Urine Bacteria 


 


Influenza Typ A,B (EIA) 


 


Ur L.pneumophila Ag 














Assessment & Plan





- Assessment and Plan (Free Text)


Plan: 





Viral URI 


Possible Flu


Hx of rheumatoid arthritis


Hx of Fibromyalgia


Hx of HTN


Hx of asthma


Hx of osteoarthritis 


Hx of PE





Plan


Flu negative


Chest x-ray reviewed, no acute disease


Patient will be placed on Tamiflu to cover for flu


Supportive treatment


Procal and Legionella negative


Follow up MRSA screen


Follow up blood and urine cultures 


Monitor patient closely





[facility] PGY-3





<Dean Burton - Last Filed: 01/30/19 14:43>





Meds





- Medications


Medications: 


                               Current Medications





Acetaminophen (Tylenol 325mg Tab)  650 mg PO Q6H PRN


   PRN Reason: Fever >100.4 F


   Last Admin: 01/29/19 16:50 Dose:  650 mg


Albuterol/Ipratropium (Duoneb 3 Mg/0.5 Mg (3 Ml) Ud)  3 ml IH [id number]


   Last Admin: 01/30/19 07:22 Dose:  3 ml


Albuterol/Ipratropium (Duoneb 3 Mg/0.5 Mg (3 Ml) Ud)  3 ml IH Q2H PRN


   PRN Reason: Shortness of Breath


Aspirin (Ecotrin)  81 mg PO DAILY [facility]


   Last Admin: 01/30/19 11:02 Dose:  81 mg


Guaifenesin (Mucinex La)  600 mg PO BID [facility]


Home Med (Home Med)  1 unit PO BID [facility]


   Last Admin: 01/30/19 11:02 Dose:  1 unit


Home Med (Home Med)  1 unit PO BID [facility]


   Last Admin: 01/30/19 11:02 Dose:  1 unit


Sodium Chloride (Sodium Chloride 0.9%)  1,000 mls @ 75 mls/hr IV .[id number] [facility]


   Last Admin: 01/30/19 11:02 Dose:  75 mls/hr


Montelukast Sodium (Singulair)  10 mg PO DAILY [facility]


   Last Admin: 01/30/19 11:02 Dose:  10 mg


Non-Formulary Medication (Tofacitinib Citrate [Xeljanz Xr])  0 mg PO DAILY [facility]


   Last Admin: 01/30/19 11:03 Dose:  11 mg


Oseltamivir Phosphate (Tamiflu Cap)  75 mg PO BID [facility]; Protocol


   Stop: 02/03/19 14:24


   Last Admin: 01/30/19 11:02 Dose:  75 mg


Prednisone (Prednisone Tab)  5 mg PO DAILY [facility]


   Last Admin: 01/30/19 11:02 Dose:  5 mg











Results





- Vital Signs


Recent Vital Signs: 


                                Last Vital Signs











Temp  98.9 F   01/30/19 08:00


 


Pulse  72   01/30/19 13:10


 


Resp  18   01/30/19 13:10


 


BP  132/80   01/30/19 08:00


 


Pulse Ox  97   01/29/19 18:28














- Labs


Result Diagrams: 


                                 01/30/19 04:25





                                 01/30/19 04:25


Labs: 


                         Laboratory Results - last 24 hr











  01/29/19 01/29/19 01/29/19





  16:00 18:00 18:00


 


WBC   


 


RBC   


 


Hgb   


 


Hct   


 


MCV   


 


MCH   


 


MCHC   


 


RDW   


 


Plt Count   


 


MPV   


 


Neut % (Auto)   


 


Lymph % (Auto)   


 


Mono % (Auto)   


 


Eos % (Auto)   


 


Baso % (Auto)   


 


Lymph # (Auto)   


 


Mono # (Auto)   


 


Eos # (Auto)   


 


Baso # (Auto)   


 


Absolute Neuts (auto)   


 


Sodium    136


 


Potassium    3.0 L


 


Chloride    99


 


Carbon Dioxide    30


 


Anion Gap    10


 


BUN    12


 


Creatinine    0.7


 


Est GFR ( Amer)    > 60


 


Est GFR (Non-Af Amer)    > 60


 


Random Glucose    90


 


Calcium    8.5


 


Phosphorus   


 


Magnesium   


 


Total Bilirubin   


 


AST   


 


ALT   


 


Alkaline Phosphatase   


 


Total Protein   


 


Albumin   


 


Globulin   


 


Albumin/Globulin Ratio   


 


Procalcitonin   < 0.05 L 


 


Ur L.pneumophila Ag  Negative  














  01/30/19 01/30/19





  04:25 04:25


 


WBC  5.1  D 


 


RBC  4.29 


 


Hgb  11.9 L 


 


Hct  36.4 


 


MCV  84.8 


 


MCH  27.7 


 


MCHC  32.7 


 


RDW  17.2 H 


 


Plt Count  180 


 


MPV  9.3 


 


Neut % (Auto)  62.7 


 


Lymph % (Auto)  24.8 


 


Mono % (Auto)  11.5 H 


 


Eos % (Auto)  0.8 L 


 


Baso % (Auto)  0.2 


 


Lymph # (Auto)  1.3 


 


Mono # (Auto)  0.6 


 


Eos # (Auto)  0.0 


 


Baso # (Auto)  0.01 


 


Absolute Neuts (auto)  3.22 


 


Sodium   137


 


Potassium   3.8


 


Chloride   103


 


Carbon Dioxide   31


 


Anion Gap   8 L


 


BUN   9


 


Creatinine   0.7


 


Est GFR ( Amer)   > 60


 


Est GFR (Non-Af Amer)   > 60


 


Random Glucose   84


 


Calcium   8.8


 


Phosphorus   2.8


 


Magnesium   2.2


 


Total Bilirubin   0.2


 


AST   41 H


 


ALT   34


 


Alkaline Phosphatase   53


 


Total Protein   7.3


 


Albumin   4.0


 


Globulin   3.3


 


Albumin/Globulin Ratio   1.2


 


Procalcitonin  


 


Ur L.pneumophila Ag  














Assessment & Plan





- Assessment and Plan (Free Text)


Plan: 





Infectious diseases Attending Physician Attestation


Patient seen and examined, discussed with medical resident. I have reviewed the 

patient's history of present illness, past medical, social, personal and family 

histories, pertinent physical exam findings, course so far in this hospital 

admission, pertinent laboratory and imaging results. I agree with the above 

findings, assessment and plan. In addition, patient with probable systemic viral

illness with Influenza, will start Tamiflu and monitor clinical response. 

Reviewed CXR which did not show pneumonia. Will follow up cultures.

## 2019-01-30 NOTE — CARD
--------------- APPROVED REPORT --------------





Date of service: 01/29/2019



EKG Measurement

Heart [id number]

[id number]

[id number]

[id number]

[id number]



<Conclusion>

*** Poor data quality, interpretation may be adversely affected

Normal sinus rhythm

T wave abnormality, consider inferior ischemia

T wave abnormality, consider anterolateral ischemia

Abnormal ECG

## 2019-01-30 NOTE — CP.PCM.PN
<Augie Dickinson - Last Filed: 01/30/19 14:19>





Subjective





- Date & Time of Evaluation


Date of Evaluation: 01/30/19


Time of Evaluation: 06:20





- Subjective


Subjective: 





Pt seen and examined. Pt reports wheezing but denies chest pain or SOB. Per 

nursing, pt reported chest tightness and requested nebulizer, pt tolerated 

breakfast. 





Objective





- Vital Signs/Intake and Output


Vital Signs (last 24 hours): 


                                        











Temp Pulse Resp BP Pulse Ox


 


 98.9 F   72   18   132/80   97 


 


 01/30/19 08:00  01/30/19 13:10  01/30/19 13:10  01/30/19 08:00  01/29/19 18:28











- Medications


Medications: 


                               Current Medications





Acetaminophen (Tylenol 325mg Tab)  650 mg PO Q6H PRN


   PRN Reason: Fever >100.4 F


   Last Admin: 01/29/19 16:50 Dose:  650 mg


Albuterol/Ipratropium (Duoneb 3 Mg/0.5 Mg (3 Ml) Ud)  3 ml IH [id number] [facility]


   Last Admin: 01/30/19 07:22 Dose:  3 ml


Albuterol/Ipratropium (Duoneb 3 Mg/0.5 Mg (3 Ml) Ud)  3 ml IH Q2H PRN


   PRN Reason: Shortness of Breath


Aspirin (Ecotrin)  81 mg PO DAILY [facility]


   Last Admin: 01/30/19 11:02 Dose:  81 mg


Guaifenesin (Mucinex La)  600 mg PO BID [facility]


Home Med (Home Med)  1 unit PO BID [facility]


   Last Admin: 01/30/19 11:02 Dose:  1 unit


Home Med (Home Med)  1 unit PO BID [facility]


   Last Admin: 01/30/19 11:02 Dose:  1 unit


Sodium Chloride (Sodium Chloride 0.9%)  1,000 mls @ 75 mls/hr IV .[id number] [facility]


   Last Admin: 01/30/19 11:02 Dose:  75 mls/hr


Montelukast Sodium (Singulair)  10 mg PO DAILY [facility]


   Last Admin: 01/30/19 11:02 Dose:  10 mg


Non-Formulary Medication (Tofacitinib Citrate [Xeljanz Xr])  0 mg PO DAILY [facility]


   Last Admin: 01/30/19 11:03 Dose:  11 mg


Oseltamivir Phosphate (Tamiflu Cap)  75 mg PO BID [facility]; Protocol


   Stop: 02/03/19 14:24


   Last Admin: 01/30/19 11:02 Dose:  75 mg


Prednisone (Prednisone Tab)  5 mg PO DAILY [facility]


   Last Admin: 01/30/19 11:02 Dose:  5 mg











- Labs


Labs: 


                                        





                                 01/30/19 04:25 





                                 01/30/19 04:25 











- Constitutional


Appears: No Acute Distress





- Head Exam


Head Exam: ATRAUMATIC, NORMOCEPHALIC





- Eye Exam


Eye Exam: EOMI





- ENT Exam


ENT Exam: Mucous Membranes Moist





- Neck Exam


Neck Exam: Full ROM





- Respiratory Exam


Respiratory Exam: Wheezes, NORMAL BREATHING PATTERN.  absent: Accessory Muscle 

Use





- Cardiovascular Exam


Cardiovascular Exam: RRR, +S1, +S2.  absent: Diastolic murmur, Murmur





- GI/Abdominal Exam


GI & Abdominal Exam: Soft, Normal Bowel Sounds.  absent: Tenderness





- Extremities Exam


Extremities Exam: Full ROM.  absent: Pedal Edema





- Neurological Exam


Neurological Exam: Alert, Awake, Oriented x3





- Psychiatric Exam


Psychiatric exam: Normal Affect, Normal Mood





- Skin


Skin Exam: Dry, Intact, Warm





Assessment and Plan





- Assessment and Plan (Free Text)


Assessment: 





Pt is a 49 yo male with a PMH of RA, fibro, HTN, asthma, OA, raynaud's syndrome,

PE who presents to the ED complaining of a 1 week history of 101 fevers along 

with cough and nasal congestion.


Plan: 





Fever, Cough, Body Aches


- likely viral etiology


- Urine culture GNR


- legionella negative 


- procal negative


- blood cultures, MRSA screen follow up


- tylenol for fever PRN


- tamiflu


- 


- start mucinex


- ID consulted, supportive treatment, monitor pt closely





Hypokalemia


- K 3.8, Mg 2.2, Phos 2.8


- replete PRN


- will hold HCTZ at this time, pt is normotensive, advised pt to follow up with 

her PMD to possible change this medication





Asthma


- continue home singulair





Fibromyalgia 


- continue Savella





HLD


- pt not on home statin


- will hold off on starting at this time





HTN


- hold HCTZ, pt is normotensive at this time





RA


- prednisone 5mg PO





Ppx


- SCD


- protonix








Pt seen, examined, assessment and plan discussed with Dr Rebecca Dickinson PGY1, Internal Medicine Resident





<Rebecca Donahue R - Last Filed: 01/31/19 17:40>





Objective





- Vital Signs/Intake and Output


Vital Signs (last 24 hours): 


                                        











Temp Pulse Resp BP Pulse Ox


 


 98.4 F   72   18   118/80   99 


 


 01/31/19 14:00  01/31/19 14:00  01/31/19 14:00  01/31/19 14:00  01/31/19 14:00








Intake and Output: 


                                        











 01/31/19 01/31/19





 06:59 18:59


 


Intake Total 180 1040


 


Balance 180 1040














- Labs


Labs: 


                                        





                                 01/31/19 06:30 





                                 01/31/19 06:30 











Attending/Attestation





- Attestation


I have personally seen and examined this patient.: Yes


I have fully participated in the care of the patient.: Yes


I have reviewed all pertinent clinical information, including history, physical 

exam and plan: Yes


Notes (Text): 


Patient seen and examined by me with resident at 9:30AM in 1/30/19.  Case 

including HPI, physical exam, and assessment and plan discussed with resident. 

Agree with above with following additions/corrections.


Patient is a 48-year-old female with past medical history significant for 

hyperlipidemia, mild intermittent asthma, fibromyalgia, rheumatoid arthritis, 

essential hypertension, osteoarthritis, and Raynaud's syndrome the presented to 

the emergency room with fever. Patient states that she is feeling better. 

However, she states, now she has a cough and had some shortness of breath 

earlier. Nebulizer treatment helped. Patient denies any fevers or chills today. 

No headaches or dizziness. No chest pain or palpitation. No nausea, vomiting, or

abdominal pain. No diarrhea or constipation. No dysuria. However, patient states

her urine smells "foul."


Physical exam:


General: Awake and alert lying in bed in no acute distress


HEENT: Normocephalic, atraumatic. Extraocular muscles intact, pupils equal and 

reactive, no scleral icterus. Oropharynx is pink and moist. No pharyngeal 

erythema or exudate apreciated. Neck is supple. Positive nasal turbinate edema.


Cardiovascular: Regular rhythm. Normal S1 and S2. No murmurs, rubs, or gallops 

appreciated


Pulmonary: Normal respiratory effort. No rhonchi, rales, or wheezing 

appreciated.


Gastrointestinal: Soft, nondistended. Nontender. Positive bowel sounds all 4 

quadrants. No guarding. 


Musculoskeletal:  Moves all extremities. No calf tenderness. No edema 

appreciated 


Central nervous system: AAOx3,  CN 2-12 grossly intact. 5 out of 5 muscle 

strength all extremities.


Dermatologic:  Skin warm and dry.


Assessment and plan:  Patient is a 48-year-old female with past medical history 

significant for hyperlipidemia, mild intermittent asthma, fibromyalgia, 

rheumatoid arthritis, essential hypertension, osteoarthritis, and Raynaud's 

syndrome the presented to the emergency room with fever. 


1. Fever. SIRS. Likely viral. Improved. Fevers resolved. Continue Tamiflu. ID 

following, recommendations appreciated. Pro-calcitonin <0.05. Blood cultures 

negative for 24 hours. Pending urine culture. Continue Tylenol as needed for 

fever. Continue IV fluids. Mucinex added. 


2. Severe hypokalemia. Resolved. Continue to monitor.


3. Essential hypertension. Home HCTZ held for now secondary to BP being on lower

side and hypokalemia. 


4. RA. Continue home prednisone 5mg Daily. Contiue home Xeljanz


5. Fibromyalgia. Continue home Savella


6. OA. Continue home Duexis


7. Mild intermittent asthma. Not in acute exacerbation. Continue home singulair.

Continue nebulizer treatments as needed. 


8. DVT prophylaxis. SCDs and ambulation


9. Patient is a full code. 


Case was discussed in detail with the patient regarding current diagnosis and 

treatment plan. All questions answered.

## 2019-01-31 VITALS — HEART RATE: 72 BPM | DIASTOLIC BLOOD PRESSURE: 80 MMHG | SYSTOLIC BLOOD PRESSURE: 118 MMHG | TEMPERATURE: 98.4 F

## 2019-01-31 VITALS — OXYGEN SATURATION: 99 %

## 2019-01-31 LAB
ALBUMIN SERPL-MCNC: 3.7 G/DL (ref 3–4.8)
ALBUMIN/GLOB SERPL: 1.3 {RATIO} (ref 1.1–1.8)
ALT SERPL-CCNC: 27 U/L (ref 7–56)
AST SERPL-CCNC: 38 U/L (ref 14–36)
BASOPHILS # BLD AUTO: 0.02 K/MM3 (ref 0–2)
BASOPHILS NFR BLD: 0.4 % (ref 0–3)
BUN SERPL-MCNC: 9 MG/DL (ref 7–21)
CALCIUM SERPL-MCNC: 8.5 MG/DL (ref 8.4–10.5)
EOSINOPHIL # BLD: 0.1 10*3/UL (ref 0–0.7)
EOSINOPHIL NFR BLD: 1.9 % (ref 1.5–5)
ERYTHROCYTE [DISTWIDTH] IN BLOOD BY AUTOMATED COUNT: 17.1 % (ref 11.5–14.5)
GFR NON-AFRICAN AMERICAN: > 60
HGB BLD-MCNC: 10.4 G/DL (ref 12–16)
LYMPHOCYTES # BLD: 1.6 10*3/UL (ref 1.2–3.4)
LYMPHOCYTES NFR BLD AUTO: 33.4 % (ref 22–35)
MCH RBC QN AUTO: 28 PG (ref 25–35)
MCHC RBC AUTO-ENTMCNC: 33 G/DL (ref 31–37)
MCV RBC AUTO: 84.9 FL (ref 80–105)
MONOCYTES # BLD AUTO: 0.5 10*3/UL (ref 0.1–0.6)
MONOCYTES NFR BLD: 10.1 % (ref 1–6)
PLATELET # BLD: 166 10^3/UL (ref 120–450)
PMV BLD AUTO: 9.5 FL (ref 7–11)
RBC # BLD AUTO: 3.71 10^6/UL (ref 3.5–6.1)
WBC # BLD AUTO: 4.9 10^3/UL (ref 4.5–11)

## 2019-01-31 RX ADMIN — GUAIFENESIN SCH MG: 600 TABLET, EXTENDED RELEASE ORAL at 09:55

## 2019-01-31 RX ADMIN — IPRATROPIUM BROMIDE AND ALBUTEROL SULFATE SCH ML: .5; 3 SOLUTION RESPIRATORY (INHALATION) at 07:43

## 2019-01-31 RX ADMIN — IPRATROPIUM BROMIDE AND ALBUTEROL SULFATE SCH: .5; 3 SOLUTION RESPIRATORY (INHALATION) at 04:05

## 2019-01-31 RX ADMIN — IPRATROPIUM BROMIDE AND ALBUTEROL SULFATE SCH ML: .5; 3 SOLUTION RESPIRATORY (INHALATION) at 15:43

## 2019-01-31 RX ADMIN — IPRATROPIUM BROMIDE AND ALBUTEROL SULFATE SCH ML: .5; 3 SOLUTION RESPIRATORY (INHALATION) at 11:31

## 2019-01-31 NOTE — CP.PCM.PN
<Adalberto Lim - Last Filed: 01/31/19 12:47>





Subjective





- Date & Time of Evaluation


Date of Evaluation: 01/31/19


Time of Evaluation: 09:45





- Subjective


Subjective: 





ID Progress Note





Patient seen and examined. Patient states she feels better. Still complains of 

cough. No fevers. 





Objective





- Vital Signs/Intake and Output


Vital Signs (last 24 hours): 


                                        











Temp Pulse Resp BP Pulse Ox


 


 98.2 F   78   18   111/71   99 


 


 01/31/19 06:00  01/31/19 06:00  01/31/19 06:00  01/31/19 06:00  01/31/19 06:00








Intake and Output: 


                                        











 01/31/19 01/31/19





 06:59 18:59


 


Intake Total 180 


 


Balance 180 














- Medications


Medications: 


                               Current Medications





Acetaminophen (Tylenol 325mg Tab)  650 mg PO Q6H PRN


   PRN Reason: Fever >100.4 F


   Last Admin: 01/29/19 16:50 Dose:  650 mg


Albuterol/Ipratropium (Duoneb 3 Mg/0.5 Mg (3 Ml) Ud)  3 ml IH [id number] [facility]


   Last Admin: 01/31/19 11:31 Dose:  3 ml


Albuterol/Ipratropium (Duoneb 3 Mg/0.5 Mg (3 Ml) Ud)  3 ml IH Q2H PRN


   PRN Reason: Shortness of Breath


Aspirin (Ecotrin)  81 mg PO DAILY [facility]


   Last Admin: 01/31/19 09:55 Dose:  81 mg


Guaifenesin (Mucinex La)  600 mg PO BID [facility]


   Last Admin: 01/31/19 09:55 Dose:  600 mg


Home Med (Home Med)  1 unit PO BID [facility]


   Last Admin: 01/31/19 09:56 Dose:  1 unit


Home Med (Home Med)  1 unit PO BID [facility]


   Last Admin: 01/31/19 09:56 Dose:  1 unit


Sodium Chloride (Sodium Chloride 0.9%)  1,000 mls @ 75 mls/hr IV .[id number] [facility]


   Last Admin: 01/30/19 11:02 Dose:  75 mls/hr


Montelukast Sodium (Singulair)  10 mg PO DAILY [facility]


   Last Admin: 01/31/19 09:55 Dose:  10 mg


Non-Formulary Medication (Tofacitinib Citrate [Xeljanz Xr])  0 mg PO DAILY [facility]


   Last Admin: 01/30/19 11:03 Dose:  11 mg


Oseltamivir Phosphate (Tamiflu Cap)  75 mg PO BID [facility]; Protocol


   Stop: 02/03/19 14:24


   Last Admin: 01/31/19 09:55 Dose:  75 mg


Prednisone (Prednisone Tab)  5 mg PO DAILY [facility]


   Last Admin: 01/31/19 09:55 Dose:  5 mg











- Labs


Labs: 


                                        





                                 01/31/19 06:30 





                                 01/31/19 06:30 











- Constitutional


Appears: Non-toxic, No Acute Distress





- Head Exam


Head Exam: ATRAUMATIC, NORMAL INSPECTION, NORMOCEPHALIC





- Respiratory Exam


Respiratory Exam: Decreased Breath Sounds, NORMAL BREATHING PATTERN





- Cardiovascular Exam


Cardiovascular Exam: RRR, +S1, +S2





- GI/Abdominal Exam


GI & Abdominal Exam: Soft, Normal Bowel Sounds.  absent: Tenderness





- Extremities Exam


Extremities Exam: Normal Inspection.  absent: Pedal Edema





- Neurological Exam


Neurological Exam: Alert, Awake, Oriented x3





- Psychiatric Exam


Psychiatric exam: Normal Affect, Normal Mood





- Skin


Skin Exam: Dry, Intact, Warm





Assessment and Plan





- Assessment and Plan (Free Text)


Plan: 





Viral URI 


Probable Flu


E. coli UTI


Hx of rheumatoid arthritis


Hx of Fibromyalgia


Hx of HTN


Hx of asthma


Hx of osteoarthritis 


Hx of PE





Plan


Continue Ciprofloxacin for 5 days for UTI


Finish course of Tamiflu for Flu


Supportive treatment


Procal and Legionella negative


MRSA screen negative


Blood culture negative


Monitor patient closely





Raphael, PGY-3








<Dean Burtno - Last Filed: 01/31/19 15:07>





Objective





- Vital Signs/Intake and Output


Vital Signs (last 24 hours): 


                                        











Temp Pulse Resp BP Pulse Ox


 


 98.2 F   78   18   111/71   99 


 


 01/31/19 06:00  01/31/19 06:00  01/31/19 06:00  01/31/19 06:00  01/31/19 06:00








Intake and Output: 


                                        











 01/31/19 01/31/19





 06:59 18:59


 


Intake Total 180 


 


Balance 180 














- Medications


Medications: 


                               Current Medications





Acetaminophen (Tylenol 325mg Tab)  650 mg PO Q6H PRN


   PRN Reason: Fever >100.4 F


   Last Admin: 01/29/19 16:50 Dose:  650 mg


Albuterol/Ipratropium (Duoneb 3 Mg/0.5 Mg (3 Ml) Ud)  3 ml IH [id number] [facility]


   Last Admin: 01/31/19 11:31 Dose:  3 ml


Albuterol/Ipratropium (Duoneb 3 Mg/0.5 Mg (3 Ml) Ud)  3 ml IH Q2H PRN


   PRN Reason: Shortness of Breath


Aspirin (Ecotrin)  81 mg PO DAILY [facility]


   Last Admin: 01/31/19 09:55 Dose:  81 mg


Guaifenesin (Mucinex La)  600 mg PO BID [facility]


   Last Admin: 01/31/19 09:55 Dose:  600 mg


Home Med (Home Med)  1 unit PO BID [facility]


   Last Admin: 01/31/19 09:56 Dose:  1 unit


Home Med (Home Med)  1 unit PO BID [facility]


   Last Admin: 01/31/19 09:56 Dose:  1 unit


Sodium Chloride (Sodium Chloride 0.9%)  1,000 mls @ 75 mls/hr IV .[id number] [facility]


   Last Admin: 01/30/19 11:02 Dose:  75 mls/hr


Montelukast Sodium (Singulair)  10 mg PO DAILY [facility]


   Last Admin: 01/31/19 09:55 Dose:  10 mg


Non-Formulary Medication (Tofacitinib Citrate [Xeljanz Xr])  0 mg PO DAILY [facility]


   Last Admin: 01/30/19 11:03 Dose:  11 mg


Oseltamivir Phosphate (Tamiflu Cap)  75 mg PO BID [facility]; Protocol


   Stop: 02/03/19 14:24


   Last Admin: 01/31/19 09:55 Dose:  75 mg


Prednisone (Prednisone Tab)  5 mg PO DAILY [facility]


   Last Admin: 01/31/19 09:55 Dose:  5 mg











- Labs


Labs: 


                                        





                                 01/31/19 06:30 





                                 01/31/19 06:30 











Assessment and Plan





- Assessment and Plan (Free Text)


Plan: 





Infectious diseases Attending Physician Attestation


Patient seen and examined, discussed with medical resident. I have reviewed the 

patient's history of present illness, past medical, social, personal and family 

histories, pertinent physical exam findings, course so far in this hospital 

admission, pertinent laboratory and imaging results. I agree with the above 

findings, assessment and plan. In addition, patient with probable viral upper 

respiratory tract infection - will complete 5 day course of Tamiflu. Patient 

with UTI with pan-sensitive E. coli - can finish a 3-5 day course of 

Ciprofloxacin. Discussed with Dr. ANGELY Donahue.

## 2019-01-31 NOTE — CP.PCM.DIS
<Augie Dickinson - Last Filed: 01/31/19 15:04>





Provider





- Provider


Date of Admission: 


01/29/19 13:00





Attending physician: 


Rebecca Donahue DO





Consults: 








01/29/19 14:21


Physician Consult Routine 


   Comment: 


   Consulting Provider: Dean Burton


   Consulting Physician: Dean Burton


   Reason for Consult: fever











Time Spent in preparation of Discharge (in minutes): 40





Diagnosis





- Discharge Diagnosis


(1) Cough


Status: Acute   Priority: High   





(2) Asthma


Status: Acute   Priority: High   





(3) Fibromyalgia


Status: Chronic   Priority: Medium   





(4) Fibromyalgia


Status: Chronic   Priority: Medium   





(5) HLD (hyperlipidemia)


Status: Chronic   





(6) HTN (hypertension)


Status: Chronic   Priority: High   





(7) Fever


Status: Acute   





(8) Hypokalemia


Status: Acute   Priority: High   





Hospital Course





- Lab Results


Lab Results: 


                                  Micro Results





01/29/19 13:00   Urine Random   Urine Culture - Final


                            Escherichia Coli


01/29/19 18:30   Naris   MRSA Culture (Admit) - Final


                            MRSA NOT DETECTED


01/29/19 16:00   Blood   Blood Culture - Preliminary


                            NO GROWTH AFTER 24 HOURS





                             Most Recent Lab Values











WBC  4.9 10^3/uL (4.5-11.0)   01/31/19  06:30    


 


RBC  3.71 10^6/uL (3.5-6.1)   01/31/19  06:30    


 


Hgb  10.4 g/dL (12.0-16.0)  L  01/31/19  06:30    


 


Hct  31.5 % (36.0-48.0)  L  01/31/19  06:30    


 


MCV  84.9 fl (80.0-105.0)   01/31/19  06:30    


 


MCH  28.0 pg (25.0-35.0)   01/31/19  06:30    


 


MCHC  33.0 g/dl (31.0-37.0)   01/31/19  06:30    


 


RDW  17.1 % (11.5-14.5)  H  01/31/19  06:30    


 


Plt Count  166 10^3/uL (120.0-450.0)   01/31/19  06:30    


 


MPV  9.5 fl (7.0-11.0)   01/31/19  06:30    


 


Neut % (Auto)  54.2 % (50.0-68.0)   01/31/19  06:30    


 


Lymph % (Auto)  33.4 % (22.0-35.0)   01/31/19  06:30    


 


Mono % (Auto)  10.1 % (1.0-6.0)  H  01/31/19  06:30    


 


Eos % (Auto)  1.9 % (1.5-5.0)   01/31/19  06:30    


 


Baso % (Auto)  0.4 % (0.0-3.0)   01/31/19  06:30    


 


Lymph # (Auto)  1.6  (1.2-3.4)   01/31/19  06:30    


 


Mono # (Auto)  0.5  (0.1-0.6)   01/31/19  06:30    


 


Eos # (Auto)  0.1  (0.0-0.7)   01/31/19  06:30    


 


Baso # (Auto)  0.02 K/mm3 (0.0-2.0)   01/31/19  06:30    


 


Absolute Neuts (auto)  2.63  (1.4-6.5)   01/31/19  06:30    


 


Sodium  139 mmol/L (132-148)   01/31/19  06:30    


 


Potassium  3.5 mmol/L (3.6-5.0)  L  01/31/19  06:30    


 


Chloride  107 mmol/L ()   01/31/19  06:30    


 


Carbon Dioxide  28 mmol/L (21-33)   01/31/19  06:30    


 


Anion Gap  8  (10-20)  L  01/31/19  06:30    


 


BUN  9 mg/dL (7-21)   01/31/19  06:30    


 


Creatinine  0.6 mg/dl (0.7-1.2)  L  01/31/19  06:30    


 


Est GFR ( Amer)  > 60   01/31/19  06:30    


 


Est GFR (Non-Af Amer)  > 60   01/31/19  06:30    


 


Random Glucose  83 mg/dL ()   01/31/19  06:30    


 


Calcium  8.5 mg/dL (8.4-10.5)   01/31/19  06:30    


 


Phosphorus  2.7 mg/dL (2.5-4.5)   01/31/19  06:30    


 


Magnesium  1.8 mg/dL (1.7-2.2)   01/31/19  06:30    


 


Total Bilirubin  0.2 mg/dL (0.2-1.3)   01/31/19  06:30    


 


AST  38 U/L (14-36)  H  01/31/19  06:30    


 


ALT  27 U/L (7-56)   01/31/19  06:30    


 


Alkaline Phosphatase  47 U/L ()   01/31/19  06:30    


 


Lactate Dehydrogenase  541 U/L (333-699)   01/29/19  10:45    


 


Total Creatine Kinase  110 U/L ()   01/29/19  10:45    


 


Troponin I  < 0.01 ng/mL  01/29/19  10:45    


 


Total Protein  6.7 g/dL (5.8-8.3)   01/31/19  06:30    


 


Albumin  3.7 g/dL (3.0-4.8)   01/31/19  06:30    


 


Globulin  2.9 gm/dL  01/31/19  06:30    


 


Albumin/Globulin Ratio  1.3  (1.1-1.8)   01/31/19  06:30    


 


Procalcitonin  < 0.05 NG/ML (0.19-0.49)  L  01/29/19  18:00    


 


Urine Color  Yellow  (YELLOW)   01/29/19  13:00    


 


Urine Appearance  Turbid  (CLEAR)   01/29/19  13:00    


 


Urine pH  6.5  (4.7-8.0)   01/29/19  13:00    


 


Ur Specific Gravity  1.025  (1.005-1.035)   01/29/19  13:00    


 


Urine Protein  100 mg/dL (<30 mg/dL)  H  01/29/19  13:00    


 


Urine Glucose (UA)  Negative mg/dL (NEGATIVE)   01/29/19  13:00    


 


Urine Ketones  >=80 mg/dL (NEGATIVE)   01/29/19  13:00    


 


Urine Blood  Negative  (NEGATIVE)   01/29/19  13:00    


 


Urine Nitrate  Positive  (NEGATIVE)  H  01/29/19  13:00    


 


Urine Bilirubin  Negative  (NEGATIVE)   01/29/19  13:00    


 


Urine Urobilinogen  0.2 E.U./dL (<1 E.U./dL)   01/29/19  13:00    


 


Ur Leukocyte Esterase  Negative Lore/uL (NEGATIVE)   01/29/19  13:00    


 


Urine RBC  None /hpf (0-2)   01/29/19  13:00    


 


Urine WBC  0 - 2 /hpf (0-6)   01/29/19  13:00    


 


Ur Epithelial Cells  3 - 4 /hpf (0-5)   01/29/19  13:00    


 


Urine Bacteria  Many /hpf (NONE)   01/29/19  13:00    


 


Influenza Typ A,B (EIA)  Negative for flu a/b  (NEGATIVE)   01/29/19  10:45    


 


Ur L.pneumophila Ag  Negative  (NEGATIVE)   01/29/19  16:00    














- Hospital Course


Hospital Course: 





Hospitalization


Pt is a 47 yo male with a PMH of RA, fibro, HTN, asthma, OA, raynaud's syndrome,

PE who presents to the ED complaining of a 1 week history of 101 fevers along 

with cough and nasal congestion. Pt complains of generalized weakness. Pt tried 

to use Laura Selzer plus Cold and Flu but it did not help with her symptoms. Pt 

states she has been having trouble eating and drinking because everything tastes

"nasty". Denies sick contacts. Denies travel. Reports chills and headache. 





Discharge


Please follow up with primary care physician, Dr Toledo within the next 3-5 

days, please have repeat urine testing done once you have completed antibiotics.

Please take Mucinex for the next 4 days for cough, you are being sent with a 

prescription. Please continue taking Tamiflu twice a day, starting tonight, for 

six more doses. Please resume all home medications as previously prescribed. 

Found to be positive for a urinary tract infection. Given an antibiotic, 

Ciprofloxacin. Please take this antibiotic, Ciprofloxacin 500mg Twice a day for 

the next 5 days for a total of 10 doses, make sure to finish the entire bottle 

of capsules.





- Date & Time of H&P


Date of H&P: 01/31/19


Time of H&P: 06:00





Discharge Exam





- Head Exam


Head Exam: ATRAUMATIC, NORMAL INSPECTION, NORMOCEPHALIC





- Eye Exam


Eye Exam: EOMI





- ENT Exam


ENT Exam: Mucous Membranes Moist





- Respiratory Exam


Respiratory Exam: Wheezes.  absent: Accessory Muscle Use, Respiratory Distress





- Cardiovascular Exam


Cardiovascular Exam: RRR, +S1, +S2.  absent: Diastolic murmur, Systolic Murmur





- GI/Abdominal Exam


GI & Abdominal Exam: Normal Bowel Sounds





- Extremities Exam


Extremities exam: full ROM, pedal pulses present





- Neurological Exam


Neurological exam: Alert, Oriented x3





- Psychiatric Exam


Psychiatric exam: Normal Affect, Normal Mood





- Skin


Skin Exam: Dry, Normal Color, Warm





Discharge Plan





- Discharge Medications


Prescriptions: 


Ciprofloxacin HCl [Cipro] 500 mg PO Q12 #14 tablet


guaiFENesin [mucINEX] 600 mg PO BID #8 tab


RX: Oseltamivir Cap [Tamiflu Cap] 75 mg PO BID #6 capsule





- Follow Up Plan


Condition: FAIR


Disposition: HOME/ ROUTINE


Instructions:  Hypokalemia (DC), Cough, Runny Nose, and the Common Cold (DC), 

Fever, Adult (DC), Pneumococcal Polysaccharide Vaccine (23-Valent), Flu Vaccine


Additional Instructions: 


1. Please follow up with your primary care physician, Dr Toledo within the 

next 3-5 days, please have repeat urine testing done once you have completed 

antibiotics.


2. Please take your Mucinex for the next 4 days for your cough, you are being 

sent with a prescription 


3. Please continue taking your Tamiflu twice a day, starting tonight, for six 

more doses


4. Please resume all home medications as previously prescribed.


5. You were found to be positive for a urinary tract infection. You are being 

given an antibiotic, Ciprofloxacin. Please take this antibiotic, Ciprofloxacin 

500mg Twice a day for the next 5 days for a total of 10 doses, make sure to 

finish the entire bottle of capsules, PLEASE TAKE WITH FOOD.


6. If your symptoms return, please go to the nearest emergency department


Referrals: 


Rodney Toledo DO [Family Provider] - 





<Rebecca Donahue - Last Filed: 02/05/19 18:31>





Provider





- Provider


Date of Admission: 


01/29/19 13:00





Attending physician: 


Rebecca Donahue DO





Consults: 








01/29/19 14:21


Physician Consult Routine 


   Comment: 


   Consulting Provider: Dean Burton


   Consulting Physician: Dean Burton


   Reason for Consult: fever














Hospital Course





- Lab Results


Lab Results: 


                                  Micro Results





01/29/19 16:00   Blood   Blood Culture - Final


                            NO GROWTH AFTER 5 DAYS


01/29/19 16:00   Blood   Gram Stain - Final


                            TEST NOT PERFORMED


01/29/19 13:00   Urine Random   Urine Culture - Final


                            Escherichia Coli


01/29/19 18:30   Naris   MRSA Culture (Admit) - Final


                            MRSA NOT DETECTED





                             Most Recent Lab Values











WBC  4.9 10^3/uL (4.5-11.0)   01/31/19  06:30    


 


RBC  3.71 10^6/uL (3.5-6.1)   01/31/19  06:30    


 


Hgb  10.4 g/dL (12.0-16.0)  L  01/31/19  06:30    


 


Hct  31.5 % (36.0-48.0)  L  01/31/19  06:30    


 


MCV  84.9 fl (80.0-105.0)   01/31/19  06:30    


 


MCH  28.0 pg (25.0-35.0)   01/31/19  06:30    


 


MCHC  33.0 g/dl (31.0-37.0)   01/31/19  06:30    


 


RDW  17.1 % (11.5-14.5)  H  01/31/19  06:30    


 


Plt Count  166 10^3/uL (120.0-450.0)   01/31/19  06:30    


 


MPV  9.5 fl (7.0-11.0)   01/31/19  06:30    


 


Neut % (Auto)  54.2 % (50.0-68.0)   01/31/19  06:30    


 


Lymph % (Auto)  33.4 % (22.0-35.0)   01/31/19  06:30    


 


Mono % (Auto)  10.1 % (1.0-6.0)  H  01/31/19  06:30    


 


Eos % (Auto)  1.9 % (1.5-5.0)   01/31/19  06:30    


 


Baso % (Auto)  0.4 % (0.0-3.0)   01/31/19  06:30    


 


Lymph # (Auto)  1.6  (1.2-3.4)   01/31/19  06:30    


 


Mono # (Auto)  0.5  (0.1-0.6)   01/31/19  06:30    


 


Eos # (Auto)  0.1  (0.0-0.7)   01/31/19  06:30    


 


Baso # (Auto)  0.02 K/mm3 (0.0-2.0)   01/31/19  06:30    


 


Absolute Neuts (auto)  2.63  (1.4-6.5)   01/31/19  06:30    


 


Sodium  139 mmol/L (132-148)   01/31/19  06:30    


 


Potassium  3.5 mmol/L (3.6-5.0)  L  01/31/19  06:30    


 


Chloride  107 mmol/L ()   01/31/19  06:30    


 


Carbon Dioxide  28 mmol/L (21-33)   01/31/19  06:30    


 


Anion Gap  8  (10-20)  L  01/31/19  06:30    


 


BUN  9 mg/dL (7-21)   01/31/19  06:30    


 


Creatinine  0.6 mg/dl (0.7-1.2)  L  01/31/19  06:30    


 


Est GFR ( Amer)  > 60   01/31/19  06:30    


 


Est GFR (Non-Af Amer)  > 60   01/31/19  06:30    


 


Random Glucose  83 mg/dL ()   01/31/19  06:30    


 


Calcium  8.5 mg/dL (8.4-10.5)   01/31/19  06:30    


 


Phosphorus  2.7 mg/dL (2.5-4.5)   01/31/19  06:30    


 


Magnesium  1.8 mg/dL (1.7-2.2)   01/31/19  06:30    


 


Total Bilirubin  0.2 mg/dL (0.2-1.3)   01/31/19  06:30    


 


AST  38 U/L (14-36)  H  01/31/19  06:30    


 


ALT  27 U/L (7-56)   01/31/19  06:30    


 


Alkaline Phosphatase  47 U/L ()   01/31/19  06:30    


 


Lactate Dehydrogenase  541 U/L (333-699)   01/29/19  10:45    


 


Total Creatine Kinase  110 U/L ()   01/29/19  10:45    


 


Troponin I  < 0.01 ng/mL  01/29/19  10:45    


 


Total Protein  6.7 g/dL (5.8-8.3)   01/31/19  06:30    


 


Albumin  3.7 g/dL (3.0-4.8)   01/31/19  06:30    


 


Globulin  2.9 gm/dL  01/31/19  06:30    


 


Albumin/Globulin Ratio  1.3  (1.1-1.8)   01/31/19  06:30    


 


Procalcitonin  < 0.05 NG/ML (0.19-0.49)  L  01/29/19  18:00    


 


Urine Color  Yellow  (YELLOW)   01/29/19  13:00    


 


Urine Appearance  Turbid  (CLEAR)   01/29/19  13:00    


 


Urine pH  6.5  (4.7-8.0)   01/29/19  13:00    


 


Ur Specific Gravity  1.025  (1.005-1.035)   01/29/19  13:00    


 


Urine Protein  100 mg/dL (<30 mg/dL)  H  01/29/19  13:00    


 


Urine Glucose (UA)  Negative mg/dL (NEGATIVE)   01/29/19  13:00    


 


Urine Ketones  >=80 mg/dL (NEGATIVE)   01/29/19  13:00    


 


Urine Blood  Negative  (NEGATIVE)   01/29/19  13:00    


 


Urine Nitrate  Positive  (NEGATIVE)  H  01/29/19  13:00    


 


Urine Bilirubin  Negative  (NEGATIVE)   01/29/19  13:00    


 


Urine Urobilinogen  0.2 E.U./dL (<1 E.U./dL)   01/29/19  13:00    


 


Ur Leukocyte Esterase  Negative Lore/uL (NEGATIVE)   01/29/19  13:00    


 


Urine RBC  None /hpf (0-2)   01/29/19  13:00    


 


Urine WBC  0 - 2 /hpf (0-6)   01/29/19  13:00    


 


Ur Epithelial Cells  3 - 4 /hpf (0-5)   01/29/19  13:00    


 


Urine Bacteria  Many /hpf (NONE)   01/29/19  13:00    


 


Influenza Typ A,B (EIA)  Negative for flu a/b  (NEGATIVE)   01/29/19  10:45    


 


Ur L.pneumophila Ag  Negative  (NEGATIVE)   01/29/19  16:00    














Attending/Attestation





- Attestation


I have personally seen and examined this patient.: Yes


I have fully participated in the care of the patient.: Yes


I have reviewed all pertinent clinical information, including history, physical 

exam and plan: Yes


Notes (Text): 


Please note this DC summary is for 1/31/19


Patient seen and examined by me with resident 10:10AM and prior to discharge on 

1/31/19. Case including discharge plan discussed with resident. Agree with 

above with following additions/corrections.


Patient is a 48-year-old female with past medical history significant for 

hyperlipidemia, mild intermittent asthma, fibromyalgia, rheumatoid arthritis, 

essential hypertension, osteoarthritis, and Raynaud's syndrome the presented to 

the emergency room with fever. Please see H&P for full details. 


Patient was admitted with fevers, SIRS, severe hypokalemia, essential 

hypertension, RA, fibromyalgia, OA, and mild intermittent asthma. Influenza was 

negative. However, patient had symptoms of the flu and was started on Tamiflu. 

Patient was treated with IV fluids. ID was consulted. Patient had no 

leukocytosis. Blood cultures were negative. Procalcitonin was <0.05. Patient was

also found to have a potassium of 2.5. on admission. Patient was given 

replacement with resolution. Patient was advised to stop home HCTZ. However, 

patient stated she does not want to stop it until she sees her primary care 

doctor. Patient understands that her hypokalemia may be secondary to this 

medication. Patient was continued on her home medications for RA, fibromyalgia, 

and OA. Patient was also placed on singulair and nebulizer treatments as needed 

for history of intermittent mild asthma. On day of discharge, urine culture was 

positive for E. Coli and patient stated her urine has a foul smell. This was 

discussed with ID who recommended outpatient treatment with cipro. Patient was 

given a prescription for this. Patient was feeling much better. Fevers resolved.

Patient cleared for discharge by ID. Patient was discharged home. 


On day of discharge, patient stated she was feeling much better. Fever resolved.

Cough improved. Nasal congestion improved. Patient denies any shortness of 

breath or chest pain. No palpitations. No nausea or vomiting. No abdominal pain.

Patient was tolerating diet. No headaches or lightheadedness. No dizziness. No c

hills. No dysuria. No diarrhea or constipation. 


Physical exam:


General: Awake and alert lying in bed in no acute distress


HEENT: Normocephalic, atraumatic. Extraocular muscles intact, pupils equal and 

reactive, no scleral icterus. Oropharynx is pink and moist. No pharyngeal 

erythema or exudate appreciated. Neck is supple. Hearing grossly intact. Nose 

externally unremarkable.


Cardiovascular: Regular rhythm. Normal S1 and S2. No murmurs, rubs, or gallops 

appreciated


Pulmonary: Normal respiratory effort. No rhonchi, rales, or wheezing 

appreciated.


Gastrointestinal: Soft, nondistended. Nontender. Positive bowel sounds all 4 

quadrants. No guarding. 


Musculoskeletal:  Moves all extremities. No calf tenderness. No edema 

appreciated 


Central nervous system: AAOx3, CN 2-12 grossly intact. 5 out of 5 muscle 

strength all extremities.


Dermatologic:  Skin warm and dry.


Please see chart for full details.





Follow up instructions: Patient with PMD within 3-5 days. Patient to complete 

Tamiflu. Patient to take all other medications as prescribed. All instructions 

explained to the patient in detail. Patient both understands and agrees to all 

instructions. Written instructions also given.





Time spent in discharging the patient including chart review, medication 

reconciliation, discussion with the patient, medical resident, consultants, and 

nursing staff was approximately 40 minutes.
